# Patient Record
Sex: MALE | Race: WHITE | NOT HISPANIC OR LATINO | Employment: FULL TIME | URBAN - NONMETROPOLITAN AREA
[De-identification: names, ages, dates, MRNs, and addresses within clinical notes are randomized per-mention and may not be internally consistent; named-entity substitution may affect disease eponyms.]

---

## 2023-03-20 DIAGNOSIS — I10 PRIMARY HYPERTENSION: Primary | ICD-10-CM

## 2023-03-20 RX ORDER — METOPROLOL TARTRATE 75 MG/1
75 TABLET, FILM COATED ORAL 2 TIMES DAILY
Qty: 60 TABLET | Refills: 2 | Status: SHIPPED | OUTPATIENT
Start: 2023-03-20 | End: 2023-06-12

## 2023-03-20 RX ORDER — METOPROLOL TARTRATE 75 MG/1
75 TABLET, FILM COATED ORAL 2 TIMES DAILY
COMMUNITY
End: 2023-03-20 | Stop reason: SDUPTHER

## 2023-05-23 PROBLEM — G89.29 CHRONIC RIGHT SHOULDER PAIN: Status: ACTIVE | Noted: 2023-05-23

## 2023-05-23 PROBLEM — G43.909 MIGRAINES: Status: ACTIVE | Noted: 2023-05-23

## 2023-05-23 PROBLEM — I10 ESSENTIAL HYPERTENSION: Status: ACTIVE | Noted: 2023-05-23

## 2023-05-23 PROBLEM — M25.511 CHRONIC RIGHT SHOULDER PAIN: Status: ACTIVE | Noted: 2023-05-23

## 2023-05-23 PROBLEM — J30.9 ALLERGIC RHINITIS DUE TO ALLERGEN: Status: ACTIVE | Noted: 2023-05-23

## 2023-05-23 NOTE — PROGRESS NOTES
Subjective   Patient ID:   Kavon Renee Jr. is a 47 y.o. male who presents for Headaches.  HPI  Right shoulder pain:  I gave Prednisone last visit.  X-ray was normal in Feb 2023.  I recommended PT if still bothersome.  Symptoms x months.  No acute injury.  Radiates down the whole arm.  Finger will go numb.  Still has good ROM.  Has tried and failed Ibuprofen 800 and Flexeril.    Migraines:  Taking Metoprolol.  Had been on Zofran, Imitrex, and Propranolol in the past which did not help.  Had a normal head CT in Nov 2017.  Has been having more headaches recently.  Home Bps have been high.    Sinus problems:  Taking Claritin.  Had been on allergy shots in the past.  States allergies have been very well under control.  Worse depending on the time of the season.    HTN:  Taking Metoprolol.  BP today is 150/94.  Checking BP at home and getting 160s.  Having increased headaches.    Health maintenance:  Smoking: Never a smoker  Labs: Oct 2022  Colonoscopy: Has fam hx of colon cancer. Never had a colonoscopy. DUE - I referred to general surgery in the past and he no showed     Review of Systems  12 point review of systems negative unless stated above in HPI    Vitals:    05/31/23 1045   BP: (!) 150/94   Pulse: 59   SpO2: 98%       Physical Exam  General: Alert and oriented, well nourished, no acute distress.  Lungs: Clear to auscultation, non-labored respiration.  Heart: Normal rate, regular rhythm, no murmur, gallop or edema.  Neurologic: Awake, alert, and oriented X3, CN II-XII intact.  Psychiatric: Cooperative, appropriate mood and affect.    Assessment/Plan   BP is elevated today.  Let's add in Lisinopril with the Metoprolol.  Please keep checking at home and call if getting over 140/90.  This should hopefully help the headaches too.  If symptoms persist or worsen despite current plan of care, please contact your healthcare provider for further evaluation.  Patient instructed to contact the office if there are any  questions regarding their care or treatment.   [facility] [phone].  [facility] [phone].    Fu 1-2 months  Diagnoses and all orders for this visit:  Essential hypertension  -     lisinopril 10 mg tablet; Take 1 tablet (10 mg) by mouth once daily.  Migraine without status migrainosus, not intractable, unspecified migraine type  Allergic rhinitis, unspecified seasonality, unspecified trigger  Chronic right shoulder pain

## 2023-05-31 ENCOUNTER — OFFICE VISIT (OUTPATIENT)
Dept: PRIMARY CARE | Facility: CLINIC | Age: 48
End: 2023-05-31
Payer: COMMERCIAL

## 2023-05-31 VITALS
HEIGHT: 71 IN | SYSTOLIC BLOOD PRESSURE: 150 MMHG | DIASTOLIC BLOOD PRESSURE: 94 MMHG | BODY MASS INDEX: 33.07 KG/M2 | WEIGHT: 236.2 LBS | OXYGEN SATURATION: 98 % | HEART RATE: 59 BPM

## 2023-05-31 DIAGNOSIS — M25.511 CHRONIC RIGHT SHOULDER PAIN: ICD-10-CM

## 2023-05-31 DIAGNOSIS — G43.909 MIGRAINE WITHOUT STATUS MIGRAINOSUS, NOT INTRACTABLE, UNSPECIFIED MIGRAINE TYPE: ICD-10-CM

## 2023-05-31 DIAGNOSIS — J30.9 ALLERGIC RHINITIS, UNSPECIFIED SEASONALITY, UNSPECIFIED TRIGGER: ICD-10-CM

## 2023-05-31 DIAGNOSIS — I10 ESSENTIAL HYPERTENSION: Primary | ICD-10-CM

## 2023-05-31 DIAGNOSIS — G89.29 CHRONIC RIGHT SHOULDER PAIN: ICD-10-CM

## 2023-05-31 PROCEDURE — 3080F DIAST BP >= 90 MM HG: CPT | Performed by: PHYSICIAN ASSISTANT

## 2023-05-31 PROCEDURE — 1036F TOBACCO NON-USER: CPT | Performed by: PHYSICIAN ASSISTANT

## 2023-05-31 PROCEDURE — 3077F SYST BP >= 140 MM HG: CPT | Performed by: PHYSICIAN ASSISTANT

## 2023-05-31 PROCEDURE — 99213 OFFICE O/P EST LOW 20 MIN: CPT | Performed by: PHYSICIAN ASSISTANT

## 2023-05-31 RX ORDER — LISINOPRIL 10 MG/1
10 TABLET ORAL DAILY
Qty: 30 TABLET | Refills: 2 | Status: SHIPPED | OUTPATIENT
Start: 2023-05-31 | End: 2023-06-26

## 2023-05-31 RX ORDER — LORATADINE 10 MG/1
1 TABLET ORAL DAILY
COMMUNITY
Start: 2020-02-26

## 2023-06-10 DIAGNOSIS — I10 PRIMARY HYPERTENSION: ICD-10-CM

## 2023-06-12 RX ORDER — METOPROLOL TARTRATE 75 MG/1
TABLET, FILM COATED ORAL
Qty: 180 TABLET | Refills: 0 | Status: SHIPPED | OUTPATIENT
Start: 2023-06-12 | End: 2023-09-18

## 2023-06-25 DIAGNOSIS — I10 ESSENTIAL HYPERTENSION: ICD-10-CM

## 2023-06-26 RX ORDER — LISINOPRIL 10 MG/1
TABLET ORAL
Qty: 30 TABLET | Refills: 2 | Status: SHIPPED | OUTPATIENT
Start: 2023-06-26 | End: 2023-09-22

## 2023-09-18 DIAGNOSIS — I10 PRIMARY HYPERTENSION: ICD-10-CM

## 2023-09-18 RX ORDER — METOPROLOL TARTRATE 75 MG/1
75 TABLET, FILM COATED ORAL 2 TIMES DAILY
Qty: 60 TABLET | Refills: 2 | Status: SHIPPED | OUTPATIENT
Start: 2023-09-18 | End: 2024-01-02 | Stop reason: SDUPTHER

## 2023-09-22 DIAGNOSIS — I10 ESSENTIAL HYPERTENSION: ICD-10-CM

## 2023-09-22 RX ORDER — LISINOPRIL 10 MG/1
TABLET ORAL
Qty: 30 TABLET | Refills: 2 | Status: SHIPPED | OUTPATIENT
Start: 2023-09-22 | End: 2023-12-18 | Stop reason: SDUPTHER

## 2023-11-28 ENCOUNTER — APPOINTMENT (OUTPATIENT)
Dept: PRIMARY CARE | Facility: CLINIC | Age: 48
End: 2023-11-28
Payer: COMMERCIAL

## 2023-12-11 PROBLEM — L81.4 OTHER MELANIN HYPERPIGMENTATION: Status: ACTIVE | Noted: 2021-11-22

## 2023-12-11 PROBLEM — R63.1 EXCESSIVE THIRST: Status: ACTIVE | Noted: 2021-11-22

## 2023-12-11 PROBLEM — M54.2 NECK PAIN: Status: RESOLVED | Noted: 2021-11-22 | Resolved: 2023-12-11

## 2023-12-11 PROBLEM — E66.9 OBESITY WITH BODY MASS INDEX 30 OR GREATER: Status: ACTIVE | Noted: 2021-11-22

## 2023-12-11 PROBLEM — L82.1 OTHER SEBORRHEIC KERATOSIS: Status: ACTIVE | Noted: 2021-11-22

## 2023-12-11 PROBLEM — R51.9 ACUTE NONINTRACTABLE HEADACHE: Status: RESOLVED | Noted: 2023-12-11 | Resolved: 2023-12-11

## 2023-12-11 PROBLEM — H57.89 CYST OF EYE: Status: RESOLVED | Noted: 2023-12-11 | Resolved: 2023-12-11

## 2023-12-11 PROBLEM — M54.2 NECK PAIN: Status: ACTIVE | Noted: 2023-12-11

## 2023-12-11 PROBLEM — D18.01 HEMANGIOMA OF SKIN AND SUBCUTANEOUS TISSUE: Status: ACTIVE | Noted: 2021-11-22

## 2023-12-11 PROBLEM — L91.8 OTHER HYPERTROPHIC DISORDERS OF THE SKIN: Status: ACTIVE | Noted: 2021-11-22

## 2023-12-11 PROBLEM — D48.5 NEOPLASM OF UNCERTAIN BEHAVIOR OF SKIN: Status: RESOLVED | Noted: 2021-11-22 | Resolved: 2023-12-11

## 2023-12-11 PROBLEM — R35.1 NOCTURIA: Status: ACTIVE | Noted: 2021-11-22

## 2023-12-11 PROBLEM — D22.4 MELANOCYTIC NEVI OF SCALP AND NECK: Status: RESOLVED | Noted: 2021-11-22 | Resolved: 2023-12-11

## 2023-12-11 PROBLEM — R63.1 EXCESSIVE THIRST: Status: RESOLVED | Noted: 2023-12-11 | Resolved: 2023-12-11

## 2023-12-11 PROBLEM — R73.9 HYPERGLYCEMIA: Status: ACTIVE | Noted: 2021-11-22

## 2023-12-11 PROBLEM — G62.9 NEUROPATHY, PERIPHERAL: Status: ACTIVE | Noted: 2023-12-11

## 2023-12-18 ENCOUNTER — OFFICE VISIT (OUTPATIENT)
Dept: PRIMARY CARE | Facility: CLINIC | Age: 48
End: 2023-12-18
Payer: COMMERCIAL

## 2023-12-18 VITALS
HEIGHT: 71 IN | DIASTOLIC BLOOD PRESSURE: 107 MMHG | HEART RATE: 53 BPM | WEIGHT: 244 LBS | SYSTOLIC BLOOD PRESSURE: 159 MMHG | BODY MASS INDEX: 34.16 KG/M2

## 2023-12-18 DIAGNOSIS — E66.9 OBESITY WITH BODY MASS INDEX 30 OR GREATER: ICD-10-CM

## 2023-12-18 DIAGNOSIS — I10 ESSENTIAL HYPERTENSION: Primary | ICD-10-CM

## 2023-12-18 PROCEDURE — 3080F DIAST BP >= 90 MM HG: CPT | Performed by: REGISTERED NURSE

## 2023-12-18 PROCEDURE — 1036F TOBACCO NON-USER: CPT | Performed by: REGISTERED NURSE

## 2023-12-18 PROCEDURE — 3077F SYST BP >= 140 MM HG: CPT | Performed by: REGISTERED NURSE

## 2023-12-18 PROCEDURE — 99214 OFFICE O/P EST MOD 30 MIN: CPT | Performed by: REGISTERED NURSE

## 2023-12-18 RX ORDER — LISINOPRIL 20 MG/1
20 TABLET ORAL DAILY
Qty: 30 TABLET | Refills: 1 | Status: SHIPPED | OUTPATIENT
Start: 2023-12-18 | End: 2024-01-10

## 2023-12-18 ASSESSMENT — ENCOUNTER SYMPTOMS
COUGH: 0
CONSTIPATION: 0
HEADACHES: 0
WEAKNESS: 0
CONFUSION: 0
VOMITING: 0
RHINORRHEA: 0
EYE DISCHARGE: 0
NERVOUS/ANXIOUS: 0
FEVER: 0
WOUND: 0
ABDOMINAL PAIN: 0
SHORTNESS OF BREATH: 0
DIARRHEA: 0
CHILLS: 0
WHEEZING: 0
DIFFICULTY URINATING: 0
NAUSEA: 0
EYE REDNESS: 0
FREQUENCY: 0
DIZZINESS: 0

## 2023-12-18 NOTE — PROGRESS NOTES
"Subjective   Patient ID: Kavon Renee Jr. is a 48 y.o. male who presents for Establish Care (Discuss wt loss options; use to follow popil; hx of HTN; declining flu vaccine;).    HPI     HTN: Taking lisinopril and metoprolol. BP is very elevated in office today. He has a bp cuff at home and can check. He has been having headaches with this. Sometimes severe enough for ER visits     BMI 34: Would like to discuss weight loss options. He works second shift.  Cut out pop for 3 months. Drinking about 40 ounces of water per day.  He eats some TV dinners. He does not like to add a lot of salt. Working on cutting this out.   24 hr recall:   Does not eat breakfast.   Lunch eats pb&j   His wife buys him a chocolate pie and tv dinner, mac n cheese and chips. For breaks at work.     All other systems reviewed and negative for complaint unless stated above.    Colonoscopy: never had       Review of Systems   Constitutional:  Negative for chills and fever.   HENT:  Negative for congestion, ear pain and rhinorrhea.    Eyes:  Negative for discharge and redness.   Respiratory:  Negative for cough, shortness of breath and wheezing.    Cardiovascular:  Negative for chest pain and leg swelling.   Gastrointestinal:  Negative for abdominal pain, constipation, diarrhea, nausea and vomiting.   Genitourinary:  Negative for difficulty urinating, frequency and urgency.   Musculoskeletal:  Negative for gait problem.   Skin:  Negative for rash and wound.   Neurological:  Negative for dizziness, weakness and headaches.   Psychiatric/Behavioral:  Negative for confusion. The patient is not nervous/anxious.        Objective   BP (!) 159/107 (BP Location: Right arm, Patient Position: Sitting, BP Cuff Size: Large adult)   Pulse 53   Ht 1.803 m (5' 11\")   Wt 111 kg (244 lb)   BMI 34.03 kg/m²     Physical Exam  Vitals reviewed.   Constitutional:       Appearance: Normal appearance.   HENT:      Head: Normocephalic.      Right Ear: Tympanic " membrane, ear canal and external ear normal.      Left Ear: Tympanic membrane, ear canal and external ear normal.      Nose: No rhinorrhea.      Mouth/Throat:      Mouth: Mucous membranes are moist.      Pharynx: Oropharynx is clear.   Eyes:      Pupils: Pupils are equal, round, and reactive to light.   Cardiovascular:      Rate and Rhythm: Normal rate and regular rhythm.      Pulses: Normal pulses.   Pulmonary:      Effort: Pulmonary effort is normal.      Breath sounds: Normal breath sounds.   Abdominal:      General: Abdomen is flat. Bowel sounds are normal.      Palpations: Abdomen is soft.   Musculoskeletal:         General: No tenderness. Normal range of motion.      Right lower leg: No edema.      Left lower leg: No edema.   Lymphadenopathy:      Cervical: No cervical adenopathy.   Skin:     General: Skin is warm and dry.      Findings: No rash.   Neurological:      Mental Status: He is alert and oriented to person, place, and time.   Psychiatric:         Mood and Affect: Mood normal.         Behavior: Behavior normal.       Assessment/Plan        #HTN  BP elevated   Continue metoprolol   Increasing lisinopril   Monitor BP at home and bring to follow up   Lisinopril causing some cough   May need to change to something else   Follow up bp check in 6-8 weeks     #  Body mass index is 34.03 kg/m².   encourage healthy diet and exercise   recommend 150 minutes of exercise per week  May be interested in medications   Will discuss at follow up

## 2024-01-02 DIAGNOSIS — I10 PRIMARY HYPERTENSION: ICD-10-CM

## 2024-01-02 RX ORDER — METOPROLOL TARTRATE 75 MG/1
75 TABLET, FILM COATED ORAL 2 TIMES DAILY
Qty: 60 TABLET | Refills: 2 | Status: SHIPPED | OUTPATIENT
Start: 2024-01-02 | End: 2024-04-02

## 2024-01-09 DIAGNOSIS — I10 ESSENTIAL HYPERTENSION: ICD-10-CM

## 2024-01-10 RX ORDER — LISINOPRIL 20 MG/1
20 TABLET ORAL DAILY
Qty: 90 TABLET | Refills: 1 | Status: SHIPPED | OUTPATIENT
Start: 2024-01-10 | End: 2024-02-12 | Stop reason: SDUPTHER

## 2024-02-09 NOTE — PROGRESS NOTES
Subjective   Patient ID: Kavon Renee Jr. is a 48 y.o. male who presents for Follow-up (6wks; lisinopril was increased, doing ok but getting headaches and feeling shaky as times; cut out pop completely; not losing wt; hasn't been checking BP daily; ).    HPI     HTN: Taking lisinopril and metoprolol. BP is very elevated in office today. He has a bp cuff at home and can check. He has been having headaches with this. Sometimes severe enough for ER visits   He has not been checking his BP at home. BP is still high in office today. He can feel when it is going to be high.      BMI 34: Would like to discuss weight loss options. He works second shift.  Cut out pop for 3 months. Drinking about 40 ounces of water per day.  He eats some TV dinners. He does not like to add a lot of salt. Working on cutting this out.   24 hr recall:   Does not eat breakfast.   Lunch eats pb&j   His wife buys him a chocolate pie and tv dinner, mac n cheese and chips. For breaks at work.     He cut out TV dinners, he knows he's eats carbs.      All other systems reviewed and negative for complaint unless stated above.     Colonoscopy: never had     Review of Systems   Constitutional:  Negative for chills and fever.   HENT:  Negative for congestion, ear pain and rhinorrhea.    Eyes:  Negative for discharge and redness.   Respiratory:  Negative for cough, shortness of breath and wheezing.    Cardiovascular:  Negative for chest pain and leg swelling.   Gastrointestinal:  Negative for abdominal pain, constipation, diarrhea, nausea and vomiting.   Genitourinary:  Negative for difficulty urinating, frequency and urgency.   Musculoskeletal:  Negative for gait problem.   Skin:  Negative for rash and wound.   Neurological:  Negative for dizziness, weakness and headaches.   Psychiatric/Behavioral:  Negative for confusion. The patient is not nervous/anxious.        Objective   BP (!) 152/110 (BP Location: Right arm, Patient Position: Sitting, BP Cuff  "Size: Large adult)   Pulse 60   Ht 1.803 m (5' 11\")   Wt 112 kg (246 lb)   BMI 34.31 kg/m²     Physical Exam  Constitutional:       Appearance: Normal appearance.   Cardiovascular:      Rate and Rhythm: Normal rate and regular rhythm.   Pulmonary:      Effort: Pulmonary effort is normal.      Breath sounds: Normal breath sounds.   Skin:     General: Skin is warm and dry.   Neurological:      Mental Status: He is alert and oriented to person, place, and time. Mental status is at baseline.   Psychiatric:         Mood and Affect: Mood normal.         Behavior: Behavior normal.       Assessment/Plan           #HTN  BP elevated   Continue metoprolol   Increasing lisinopril 40mg   Monitor BP at home and bring to follow up   Lisinopril causing some cough   May need to change to something else   Follow up bp check in 6-8 weeks      #  Body mass index is 34.03 kg/m².   encourage healthy diet and exercise   recommend 150 minutes of exercise per week  May be interested in medications   Will discuss at follow up   Declines injections  Discussed topamax, wellbutrin   Going to track diet until follow up   May trial medications at follow up   "

## 2024-02-12 ENCOUNTER — OFFICE VISIT (OUTPATIENT)
Dept: PRIMARY CARE | Facility: CLINIC | Age: 49
End: 2024-02-12
Payer: COMMERCIAL

## 2024-02-12 VITALS
HEART RATE: 60 BPM | SYSTOLIC BLOOD PRESSURE: 168 MMHG | BODY MASS INDEX: 34.44 KG/M2 | WEIGHT: 246 LBS | HEIGHT: 71 IN | DIASTOLIC BLOOD PRESSURE: 108 MMHG

## 2024-02-12 DIAGNOSIS — I10 ESSENTIAL HYPERTENSION: Primary | ICD-10-CM

## 2024-02-12 PROCEDURE — 3077F SYST BP >= 140 MM HG: CPT | Performed by: REGISTERED NURSE

## 2024-02-12 PROCEDURE — 3080F DIAST BP >= 90 MM HG: CPT | Performed by: REGISTERED NURSE

## 2024-02-12 PROCEDURE — 1036F TOBACCO NON-USER: CPT | Performed by: REGISTERED NURSE

## 2024-02-12 PROCEDURE — 99213 OFFICE O/P EST LOW 20 MIN: CPT | Performed by: REGISTERED NURSE

## 2024-02-12 RX ORDER — LISINOPRIL 40 MG/1
40 TABLET ORAL DAILY
Qty: 30 TABLET | Refills: 1 | Status: SHIPPED | OUTPATIENT
Start: 2024-02-12 | End: 2024-03-12

## 2024-02-12 ASSESSMENT — ENCOUNTER SYMPTOMS
CONSTIPATION: 0
RHINORRHEA: 0
WEAKNESS: 0
VOMITING: 0
CONFUSION: 0
NAUSEA: 0
COUGH: 0
DIARRHEA: 0
DIFFICULTY URINATING: 0
NERVOUS/ANXIOUS: 0
ABDOMINAL PAIN: 0
FREQUENCY: 0
SHORTNESS OF BREATH: 0
WHEEZING: 0
EYE DISCHARGE: 0
FEVER: 0
WOUND: 0
CHILLS: 0
EYE REDNESS: 0
DIZZINESS: 0
HEADACHES: 0

## 2024-03-12 DIAGNOSIS — I10 ESSENTIAL HYPERTENSION: ICD-10-CM

## 2024-03-12 RX ORDER — LISINOPRIL 40 MG/1
40 TABLET ORAL DAILY
Qty: 90 TABLET | Refills: 1 | Status: SHIPPED | OUTPATIENT
Start: 2024-03-12 | End: 2024-05-20 | Stop reason: ALTCHOICE

## 2024-03-19 NOTE — PROGRESS NOTES
Subjective   Patient ID: Kavon Renee Jr. is a 48 y.o. male who presents for Follow-up (Lisinopril increased last visit, hasn't been monitoring BP; decreased; his coffee intake;).    HPI   HTN: Has not been having as much headaches since increase of lisinopril.   Taking lisinopril and metoprolol. BP has improved a lot! Also has lost some weight since his last visit!      BMI 34: He works second shift.  Cut out pop for 3 months. Drinking about 40 ounces of water per day.  He cut out processed foods, cut back on sugar and coffee. Eating more salads.      All other systems reviewed and negative for complaint unless stated above.     Colonoscopy: never had     Review of Systems   Constitutional:  Negative for chills and fever.   HENT:  Negative for congestion, ear pain and rhinorrhea.    Eyes:  Negative for discharge and redness.   Respiratory:  Negative for cough, shortness of breath and wheezing.    Cardiovascular:  Negative for chest pain and leg swelling.   Gastrointestinal:  Negative for abdominal pain, constipation, diarrhea, nausea and vomiting.   Genitourinary:  Negative for difficulty urinating, frequency and urgency.   Musculoskeletal:  Negative for gait problem.   Skin:  Negative for rash and wound.   Neurological:  Negative for dizziness, weakness and headaches.   Psychiatric/Behavioral:  Negative for confusion. The patient is not nervous/anxious.        Objective   /89 (BP Location: Right arm, Patient Position: Sitting, BP Cuff Size: Large adult)   Pulse 56   Wt 110 kg (242 lb 9.6 oz)   BMI 33.84 kg/m²     Physical Exam  Constitutional:       Appearance: Normal appearance.   Cardiovascular:      Rate and Rhythm: Normal rate and regular rhythm.   Pulmonary:      Effort: Pulmonary effort is normal.      Breath sounds: Normal breath sounds.   Skin:     General: Skin is warm and dry.   Neurological:      Mental Status: He is alert and oriented to person, place, and time. Mental status is at  baseline.   Psychiatric:         Mood and Affect: Mood normal.         Behavior: Behavior normal.       Assessment/Plan           #HTN  BP elevated   Continue metoprolol   Increasing lisinopril 40mg   BP better controlled!     #  Body mass index is 34.03 kg/m².   encourage healthy diet and exercise   recommend 150 minutes of exercise per week  May be interested in medications   Will discuss at follow up   Declines injections  Discussed topamax, wellbutrin   Doing well with diet changes, monitor weight loss       Follow up in 6 months or sooner if needed

## 2024-03-25 ENCOUNTER — OFFICE VISIT (OUTPATIENT)
Dept: PRIMARY CARE | Facility: CLINIC | Age: 49
End: 2024-03-25
Payer: COMMERCIAL

## 2024-03-25 VITALS
WEIGHT: 242.6 LBS | DIASTOLIC BLOOD PRESSURE: 89 MMHG | BODY MASS INDEX: 33.84 KG/M2 | HEART RATE: 56 BPM | SYSTOLIC BLOOD PRESSURE: 142 MMHG

## 2024-03-25 DIAGNOSIS — I10 ESSENTIAL HYPERTENSION: ICD-10-CM

## 2024-03-25 PROCEDURE — 1036F TOBACCO NON-USER: CPT | Performed by: REGISTERED NURSE

## 2024-03-25 PROCEDURE — 99213 OFFICE O/P EST LOW 20 MIN: CPT | Performed by: REGISTERED NURSE

## 2024-03-25 PROCEDURE — 3077F SYST BP >= 140 MM HG: CPT | Performed by: REGISTERED NURSE

## 2024-03-25 PROCEDURE — 3079F DIAST BP 80-89 MM HG: CPT | Performed by: REGISTERED NURSE

## 2024-03-25 ASSESSMENT — ENCOUNTER SYMPTOMS
RHINORRHEA: 0
DIARRHEA: 0
WOUND: 0
SHORTNESS OF BREATH: 0
DIZZINESS: 0
ABDOMINAL PAIN: 0
WEAKNESS: 0
COUGH: 0
CONSTIPATION: 0
NERVOUS/ANXIOUS: 0
FEVER: 0
DIFFICULTY URINATING: 0
CONFUSION: 0
EYE REDNESS: 0
VOMITING: 0
EYE DISCHARGE: 0
FREQUENCY: 0
NAUSEA: 0
CHILLS: 0
WHEEZING: 0
HEADACHES: 0

## 2024-04-02 DIAGNOSIS — I10 PRIMARY HYPERTENSION: ICD-10-CM

## 2024-04-02 RX ORDER — METOPROLOL TARTRATE 75 MG/1
75 TABLET, FILM COATED ORAL 2 TIMES DAILY
Qty: 60 TABLET | Refills: 2 | Status: SHIPPED | OUTPATIENT
Start: 2024-04-02 | End: 2024-05-17 | Stop reason: HOSPADM

## 2024-04-02 RX ORDER — SUMATRIPTAN SUCCINATE 25 MG/1
TABLET ORAL
COMMUNITY
Start: 2019-05-13 | End: 2024-05-29 | Stop reason: WASHOUT

## 2024-05-16 ENCOUNTER — APPOINTMENT (OUTPATIENT)
Dept: CARDIOLOGY | Facility: HOSPITAL | Age: 49
End: 2024-05-16
Payer: COMMERCIAL

## 2024-05-16 ENCOUNTER — HOSPITAL ENCOUNTER (OUTPATIENT)
Facility: HOSPITAL | Age: 49
Setting detail: OBSERVATION
Discharge: HOME | End: 2024-05-17
Attending: FAMILY MEDICINE | Admitting: INTERNAL MEDICINE
Payer: COMMERCIAL

## 2024-05-16 ENCOUNTER — APPOINTMENT (OUTPATIENT)
Dept: RADIOLOGY | Facility: HOSPITAL | Age: 49
End: 2024-05-16
Payer: COMMERCIAL

## 2024-05-16 DIAGNOSIS — R06.02 SHORTNESS OF BREATH: ICD-10-CM

## 2024-05-16 DIAGNOSIS — R53.1 WEAKNESS: ICD-10-CM

## 2024-05-16 DIAGNOSIS — I10 PRIMARY HYPERTENSION: ICD-10-CM

## 2024-05-16 DIAGNOSIS — R61 DIAPHORESIS: Primary | ICD-10-CM

## 2024-05-16 LAB
ALBUMIN SERPL BCP-MCNC: 4.5 G/DL (ref 3.4–5)
ALP SERPL-CCNC: 52 U/L (ref 33–120)
ALT SERPL W P-5'-P-CCNC: 16 U/L (ref 10–52)
ANION GAP SERPL CALC-SCNC: 13 MMOL/L (ref 10–20)
APPEARANCE UR: CLEAR
AST SERPL W P-5'-P-CCNC: 26 U/L (ref 9–39)
BASOPHILS # BLD AUTO: 0.06 X10*3/UL (ref 0–0.1)
BASOPHILS NFR BLD AUTO: 0.5 %
BILIRUB SERPL-MCNC: 0.6 MG/DL (ref 0–1.2)
BILIRUB UR STRIP.AUTO-MCNC: NEGATIVE MG/DL
BNP SERPL-MCNC: 50 PG/ML (ref 0–99)
BUN SERPL-MCNC: 14 MG/DL (ref 6–23)
CALCIUM SERPL-MCNC: 9.1 MG/DL (ref 8.6–10.3)
CARDIAC TROPONIN I PNL SERPL HS: 6 NG/L (ref 0–20)
CARDIAC TROPONIN I PNL SERPL HS: 7 NG/L (ref 0–20)
CHLORIDE SERPL-SCNC: 103 MMOL/L (ref 98–107)
CO2 SERPL-SCNC: 26 MMOL/L (ref 21–32)
COLOR UR: YELLOW
CREAT SERPL-MCNC: 1 MG/DL (ref 0.5–1.3)
D DIMER PPP FEU-MCNC: <215 NG/ML FEU
EGFRCR SERPLBLD CKD-EPI 2021: >90 ML/MIN/1.73M*2
EOSINOPHIL # BLD AUTO: 0.11 X10*3/UL (ref 0–0.7)
EOSINOPHIL NFR BLD AUTO: 0.8 %
ERYTHROCYTE [DISTWIDTH] IN BLOOD BY AUTOMATED COUNT: 13 % (ref 11.5–14.5)
FLUAV RNA RESP QL NAA+PROBE: NOT DETECTED
FLUBV RNA RESP QL NAA+PROBE: NOT DETECTED
GLUCOSE SERPL-MCNC: 99 MG/DL (ref 74–99)
GLUCOSE UR STRIP.AUTO-MCNC: NEGATIVE MG/DL
HCT VFR BLD AUTO: 47 % (ref 41–52)
HGB BLD-MCNC: 16.4 G/DL (ref 13.5–17.5)
HOLD SPECIMEN: NORMAL
IMM GRANULOCYTES # BLD AUTO: 0.04 X10*3/UL (ref 0–0.7)
IMM GRANULOCYTES NFR BLD AUTO: 0.3 % (ref 0–0.9)
KETONES UR STRIP.AUTO-MCNC: NEGATIVE MG/DL
LACTATE SERPL-SCNC: 1.4 MMOL/L (ref 0.4–2)
LEUKOCYTE ESTERASE UR QL STRIP.AUTO: NEGATIVE
LYMPHOCYTES # BLD AUTO: 1.6 X10*3/UL (ref 1.2–4.8)
LYMPHOCYTES NFR BLD AUTO: 12.2 %
MAGNESIUM SERPL-MCNC: 1.98 MG/DL (ref 1.6–2.4)
MCH RBC QN AUTO: 30.3 PG (ref 26–34)
MCHC RBC AUTO-ENTMCNC: 34.9 G/DL (ref 32–36)
MCV RBC AUTO: 87 FL (ref 80–100)
MONOCYTES # BLD AUTO: 0.64 X10*3/UL (ref 0.1–1)
MONOCYTES NFR BLD AUTO: 4.9 %
NEUTROPHILS # BLD AUTO: 10.65 X10*3/UL (ref 1.2–7.7)
NEUTROPHILS NFR BLD AUTO: 81.3 %
NITRITE UR QL STRIP.AUTO: NEGATIVE
NRBC BLD-RTO: 0 /100 WBCS (ref 0–0)
PH UR STRIP.AUTO: 6 [PH]
PLATELET # BLD AUTO: 297 X10*3/UL (ref 150–450)
POTASSIUM SERPL-SCNC: 3.9 MMOL/L (ref 3.5–5.3)
PROT SERPL-MCNC: 7.7 G/DL (ref 6.4–8.2)
PROT UR STRIP.AUTO-MCNC: NEGATIVE MG/DL
RBC # BLD AUTO: 5.42 X10*6/UL (ref 4.5–5.9)
RBC # UR STRIP.AUTO: NEGATIVE /UL
SARS-COV-2 RNA RESP QL NAA+PROBE: NOT DETECTED
SODIUM SERPL-SCNC: 138 MMOL/L (ref 136–145)
SP GR UR STRIP.AUTO: 1.02
UROBILINOGEN UR STRIP.AUTO-MCNC: <2 MG/DL
WBC # BLD AUTO: 13.1 X10*3/UL (ref 4.4–11.3)

## 2024-05-16 PROCEDURE — 83605 ASSAY OF LACTIC ACID: CPT | Performed by: FAMILY MEDICINE

## 2024-05-16 PROCEDURE — 80053 COMPREHEN METABOLIC PANEL: CPT | Performed by: FAMILY MEDICINE

## 2024-05-16 PROCEDURE — 85025 COMPLETE CBC W/AUTO DIFF WBC: CPT | Performed by: FAMILY MEDICINE

## 2024-05-16 PROCEDURE — 2500000004 HC RX 250 GENERAL PHARMACY W/ HCPCS (ALT 636 FOR OP/ED): Performed by: FAMILY MEDICINE

## 2024-05-16 PROCEDURE — 71045 X-RAY EXAM CHEST 1 VIEW: CPT | Performed by: RADIOLOGY

## 2024-05-16 PROCEDURE — 71045 X-RAY EXAM CHEST 1 VIEW: CPT

## 2024-05-16 PROCEDURE — 85379 FIBRIN DEGRADATION QUANT: CPT | Performed by: FAMILY MEDICINE

## 2024-05-16 PROCEDURE — G0378 HOSPITAL OBSERVATION PER HR: HCPCS

## 2024-05-16 PROCEDURE — 36415 COLL VENOUS BLD VENIPUNCTURE: CPT | Performed by: FAMILY MEDICINE

## 2024-05-16 PROCEDURE — 93005 ELECTROCARDIOGRAM TRACING: CPT

## 2024-05-16 PROCEDURE — 2500000001 HC RX 250 WO HCPCS SELF ADMINISTERED DRUGS (ALT 637 FOR MEDICARE OP): Performed by: NURSE PRACTITIONER

## 2024-05-16 PROCEDURE — 96372 THER/PROPH/DIAG INJ SC/IM: CPT | Performed by: NURSE PRACTITIONER

## 2024-05-16 PROCEDURE — 81003 URINALYSIS AUTO W/O SCOPE: CPT | Performed by: FAMILY MEDICINE

## 2024-05-16 PROCEDURE — 96361 HYDRATE IV INFUSION ADD-ON: CPT

## 2024-05-16 PROCEDURE — 84484 ASSAY OF TROPONIN QUANT: CPT | Performed by: FAMILY MEDICINE

## 2024-05-16 PROCEDURE — 96360 HYDRATION IV INFUSION INIT: CPT | Mod: 59

## 2024-05-16 PROCEDURE — 2500000004 HC RX 250 GENERAL PHARMACY W/ HCPCS (ALT 636 FOR OP/ED): Performed by: NURSE PRACTITIONER

## 2024-05-16 PROCEDURE — 83880 ASSAY OF NATRIURETIC PEPTIDE: CPT | Performed by: FAMILY MEDICINE

## 2024-05-16 PROCEDURE — 99285 EMERGENCY DEPT VISIT HI MDM: CPT | Mod: 25

## 2024-05-16 PROCEDURE — 87636 SARSCOV2 & INF A&B AMP PRB: CPT | Performed by: FAMILY MEDICINE

## 2024-05-16 PROCEDURE — 83735 ASSAY OF MAGNESIUM: CPT | Performed by: FAMILY MEDICINE

## 2024-05-16 RX ORDER — ONDANSETRON HYDROCHLORIDE 2 MG/ML
4 INJECTION, SOLUTION INTRAVENOUS EVERY 8 HOURS PRN
Status: DISCONTINUED | OUTPATIENT
Start: 2024-05-16 | End: 2024-05-17 | Stop reason: HOSPADM

## 2024-05-16 RX ORDER — SODIUM CHLORIDE 9 MG/ML
125 INJECTION, SOLUTION INTRAVENOUS CONTINUOUS
Status: DISCONTINUED | OUTPATIENT
Start: 2024-05-16 | End: 2024-05-17 | Stop reason: HOSPADM

## 2024-05-16 RX ORDER — LORATADINE 10 MG/1
10 TABLET ORAL DAILY
Status: DISCONTINUED | OUTPATIENT
Start: 2024-05-17 | End: 2024-05-17 | Stop reason: HOSPADM

## 2024-05-16 RX ORDER — ACETAMINOPHEN 160 MG/5ML
650 SOLUTION ORAL EVERY 4 HOURS PRN
Status: DISCONTINUED | OUTPATIENT
Start: 2024-05-16 | End: 2024-05-17 | Stop reason: HOSPADM

## 2024-05-16 RX ORDER — TALC
3 POWDER (GRAM) TOPICAL NIGHTLY PRN
Status: DISCONTINUED | OUTPATIENT
Start: 2024-05-16 | End: 2024-05-17 | Stop reason: HOSPADM

## 2024-05-16 RX ORDER — LISINOPRIL 40 MG/1
40 TABLET ORAL DAILY
Status: DISCONTINUED | OUTPATIENT
Start: 2024-05-17 | End: 2024-05-17 | Stop reason: HOSPADM

## 2024-05-16 RX ORDER — ENOXAPARIN SODIUM 100 MG/ML
40 INJECTION SUBCUTANEOUS EVERY 24 HOURS
Status: DISCONTINUED | OUTPATIENT
Start: 2024-05-16 | End: 2024-05-17 | Stop reason: HOSPADM

## 2024-05-16 RX ORDER — ACETAMINOPHEN 325 MG/1
650 TABLET ORAL EVERY 4 HOURS PRN
Status: DISCONTINUED | OUTPATIENT
Start: 2024-05-16 | End: 2024-05-17 | Stop reason: HOSPADM

## 2024-05-16 RX ORDER — FAMOTIDINE 10 MG/ML
20 INJECTION INTRAVENOUS 2 TIMES DAILY
Status: DISCONTINUED | OUTPATIENT
Start: 2024-05-16 | End: 2024-05-17 | Stop reason: HOSPADM

## 2024-05-16 RX ORDER — SUMATRIPTAN SUCCINATE 25 MG/1
25 TABLET ORAL EVERY 2 HOUR PRN
Status: DISCONTINUED | OUTPATIENT
Start: 2024-05-16 | End: 2024-05-17 | Stop reason: HOSPADM

## 2024-05-16 RX ORDER — ONDANSETRON 4 MG/1
4 TABLET, FILM COATED ORAL EVERY 8 HOURS PRN
Status: DISCONTINUED | OUTPATIENT
Start: 2024-05-16 | End: 2024-05-17 | Stop reason: HOSPADM

## 2024-05-16 RX ORDER — ACETAMINOPHEN 650 MG/1
650 SUPPOSITORY RECTAL EVERY 4 HOURS PRN
Status: DISCONTINUED | OUTPATIENT
Start: 2024-05-16 | End: 2024-05-17 | Stop reason: HOSPADM

## 2024-05-16 RX ORDER — FAMOTIDINE 20 MG/1
20 TABLET, FILM COATED ORAL 2 TIMES DAILY
Status: DISCONTINUED | OUTPATIENT
Start: 2024-05-16 | End: 2024-05-17 | Stop reason: HOSPADM

## 2024-05-16 RX ADMIN — ENOXAPARIN SODIUM 40 MG: 40 INJECTION SUBCUTANEOUS at 23:04

## 2024-05-16 RX ADMIN — FAMOTIDINE 20 MG: 20 TABLET, FILM COATED ORAL at 23:04

## 2024-05-16 RX ADMIN — SODIUM CHLORIDE 125 ML/HR: 9 INJECTION, SOLUTION INTRAVENOUS at 15:52

## 2024-05-16 SDOH — HEALTH STABILITY: MENTAL HEALTH: EXPERIENCED ANY OF THE FOLLOWING LIFE EVENTS: DEATH OF FAMILY/FRIEND

## 2024-05-16 SDOH — SOCIAL STABILITY: SOCIAL INSECURITY: WERE YOU ABLE TO COMPLETE ALL THE BEHAVIORAL HEALTH SCREENINGS?: YES

## 2024-05-16 SDOH — SOCIAL STABILITY: SOCIAL INSECURITY: HAS ANYONE EVER THREATENED TO HURT YOUR FAMILY OR YOUR PETS?: NO

## 2024-05-16 SDOH — SOCIAL STABILITY: SOCIAL INSECURITY: ABUSE: ADULT

## 2024-05-16 SDOH — SOCIAL STABILITY: SOCIAL INSECURITY: ARE THERE ANY APPARENT SIGNS OF INJURIES/BEHAVIORS THAT COULD BE RELATED TO ABUSE/NEGLECT?: NO

## 2024-05-16 SDOH — SOCIAL STABILITY: SOCIAL INSECURITY: HAVE YOU HAD THOUGHTS OF HARMING ANYONE ELSE?: NO

## 2024-05-16 SDOH — SOCIAL STABILITY: SOCIAL INSECURITY: DO YOU FEEL ANYONE HAS EXPLOITED OR TAKEN ADVANTAGE OF YOU FINANCIALLY OR OF YOUR PERSONAL PROPERTY?: NO

## 2024-05-16 SDOH — SOCIAL STABILITY: SOCIAL INSECURITY: DO YOU FEEL UNSAFE GOING BACK TO THE PLACE WHERE YOU ARE LIVING?: NO

## 2024-05-16 SDOH — SOCIAL STABILITY: SOCIAL INSECURITY: HAVE YOU HAD ANY THOUGHTS OF HARMING ANYONE ELSE?: NO

## 2024-05-16 SDOH — SOCIAL STABILITY: SOCIAL INSECURITY: DOES ANYONE TRY TO KEEP YOU FROM HAVING/CONTACTING OTHER FRIENDS OR DOING THINGS OUTSIDE YOUR HOME?: NO

## 2024-05-16 SDOH — SOCIAL STABILITY: SOCIAL INSECURITY: ARE YOU OR HAVE YOU BEEN THREATENED OR ABUSED PHYSICALLY, EMOTIONALLY, OR SEXUALLY BY ANYONE?: NO

## 2024-05-16 ASSESSMENT — COGNITIVE AND FUNCTIONAL STATUS - GENERAL
MOBILITY SCORE: 24
DAILY ACTIVITIY SCORE: 24

## 2024-05-16 ASSESSMENT — LIFESTYLE VARIABLES
PRESCIPTION_ABUSE_PAST_12_MONTHS: NO
HOW OFTEN DO YOU HAVE 6 OR MORE DRINKS ON ONE OCCASION: NEVER
HOW MANY STANDARD DRINKS CONTAINING ALCOHOL DO YOU HAVE ON A TYPICAL DAY: PATIENT DOES NOT DRINK
SUBSTANCE_ABUSE_PAST_12_MONTHS: NO
SKIP TO QUESTIONS 9-10: 1
AUDIT-C TOTAL SCORE: 0
AUDIT-C TOTAL SCORE: 0
HOW OFTEN DO YOU HAVE A DRINK CONTAINING ALCOHOL: NEVER

## 2024-05-16 ASSESSMENT — ACTIVITIES OF DAILY LIVING (ADL)
GROOMING: INDEPENDENT
BATHING: INDEPENDENT
JUDGMENT_ADEQUATE_SAFELY_COMPLETE_DAILY_ACTIVITIES: YES
HEARING - RIGHT EAR: FUNCTIONAL
HEARING - LEFT EAR: FUNCTIONAL
PATIENT'S MEMORY ADEQUATE TO SAFELY COMPLETE DAILY ACTIVITIES?: YES
DRESSING YOURSELF: INDEPENDENT
ASSISTIVE_DEVICE: EYEGLASSES
FEEDING YOURSELF: INDEPENDENT
WALKS IN HOME: INDEPENDENT
TOILETING: INDEPENDENT
ADEQUATE_TO_COMPLETE_ADL: YES
LACK_OF_TRANSPORTATION: NO

## 2024-05-16 ASSESSMENT — PATIENT HEALTH QUESTIONNAIRE - PHQ9
1. LITTLE INTEREST OR PLEASURE IN DOING THINGS: NOT AT ALL
2. FEELING DOWN, DEPRESSED OR HOPELESS: NOT AT ALL
SUM OF ALL RESPONSES TO PHQ9 QUESTIONS 1 & 2: 0

## 2024-05-16 ASSESSMENT — PAIN - FUNCTIONAL ASSESSMENT: PAIN_FUNCTIONAL_ASSESSMENT: 0-10

## 2024-05-16 ASSESSMENT — COLUMBIA-SUICIDE SEVERITY RATING SCALE - C-SSRS
2. HAVE YOU ACTUALLY HAD ANY THOUGHTS OF KILLING YOURSELF?: NO
6. HAVE YOU EVER DONE ANYTHING, STARTED TO DO ANYTHING, OR PREPARED TO DO ANYTHING TO END YOUR LIFE?: NO
1. IN THE PAST MONTH, HAVE YOU WISHED YOU WERE DEAD OR WISHED YOU COULD GO TO SLEEP AND NOT WAKE UP?: NO

## 2024-05-16 NOTE — ED PROVIDER NOTES
HPI   No chief complaint on file.      HPI  This 48-year-old male patient came to the emergency room with complaint of shortness of breath for last 4 days.  Patient states that today he feels rundown and lethargic and weak this morning and some nausea diaphoresis and vomited 1 time, felt rundown and has been having lack of energy.  Went to work when his boss called him to the office where he supposed to talk about it and we can work to when he became dizzy lightheaded and weak and started extremely short of breath as result he was brought to the ER for evaluation.  After arrival he has been more awake and alert.  Denies any difficulty moving arms or legs or any weakness arms or legs or trouble keeping balance any chest pain or chest tightness or pain or swelling legs or hemoptysis or recent travel surgery or hospitalization.  He admitted history of hypertension but denies history of diabetes prior history of coronary artery disease or history of MI he has similar episode once in the past years ago but does not know the cause of workup in the past.  Patient father has history of coronary artery stent he send uncle has coronary artery disease.  Patient denies any drug abuse he drinks alcohol very rarely.  Denies any substance abuse disorder.      Family history: Reviewed    Social history: Reviewed, denies substance abuse.  Review of system: 10 review of system as In HPI otherwise unremarkable.           No data recorded                   Patient History   Past Medical History:   Diagnosis Date    Acute nonintractable headache 12/11/2023    Cyst of eye 12/11/2023    Excessive thirst 12/11/2023    Melanocytic nevi of scalp and neck 11/22/2021    Neoplasm of uncertain behavior of skin 11/22/2021    Other conditions influencing health status 01/15/2018    Cyst     Past Surgical History:   Procedure Laterality Date    OTHER SURGICAL HISTORY  05/13/2019    Scalp lesion excision     No family history on file.  Social  History     Tobacco Use    Smoking status: Never     Passive exposure: Never    Smokeless tobacco: Never   Substance Use Topics    Alcohol use: Never    Drug use: Never   EKG done at 1543 hrs. showed normal sinus rhythm rate of 81.  Normal NV interval.  No ST stabilization.  Inferior infarct age undetermined.  No STEMI.  Abnormal EKG.  I read this EKG.    Repeat EKG done at 1704 hrs. about 1 hour and 15 minutes later showed sinus bradycardia rate of 55.  Indeterminate axis.  Anterior infarct age undetermined.  Artifact limiting interpretation.  No STEMI.  Abnormal EKG NV at this EKG.    Physical Exam   ED Triage Vitals   Temp Pulse Resp BP   -- -- -- --      SpO2 Temp src Heart Rate Source Patient Position   -- -- -- --      BP Location FiO2 (%)     -- --       Physical Exam    CONSTITUTIONAL: Moderately overweight male patient was awake alert did not look sick toxic distress talking breathing comfortably no drooling stridor noted.  No facial drooping or drooling.  Moving arms or legs no tachypnea hypoxemia respite status.  Alert oriented x 3.    HENMT: The airway was intact. There was no ear or nose discharge. No drooling or stridor. Neck was supple. Talking and breathing comfortably.      EYES: Clear bilaterally, pupils equal, round and reactive to light.     CARDIOVASCULAR: Regular rate and rhythm. No friction rub or murmur . No peripheral edema good peripheral pulses calf is nontender Homans' sign negative good skin perfusion cap refill less than 2 seconds.  RESPIRATORY: Patient was breathing comfortably. No tachypnea hypoxemia respite distress noted.  Talking breathing comfortably.    GASTROINTESTINAL: Abdomen soft positive bowel sounds abdomen soft positive bowel sound nontender no guarding rebound surgery mild obesity abdomen noted.      GENITOURINARY:  No costovertebral angle tenderness.     MUSCULOSKELETAL: Head was normocephalic atraumatic cervical thoracic lumbar spine nontender.  Chest was nontender   Both upper extremity good motion nontender intact distal pulse intact sensation.       NEUROLOGICAL: Awake alert pleasant and cooperative. No motor or sensory deficit no arms selective noted. Intact neurovascular function and motor function. No facial drooping or drooling. Talking and breathing comfortably.  Cranial nerves II to XII grossly intact. No arms selective noted. No nystagmus.      SKIN: Skin normal color for race, warm, dry and intact. No evidence of trauma or lesions. PSYCHIATRIC: Awake alert and without acute distress. No obvious depression, no suicidal thoughts or ideation. Appropriate mood. Talking and normal tone.     HEME/LYMPH: No adenopathy or splenomegaly.        CRITICAL CARE    VITAL SIGNS:                DISPOSITION      ED Course & MDM   Diagnoses as of 05/16/24 1821   Diaphoresis   Weakness   Shortness of breath   Primary hypertension       Medical Decision Making  This 48-year-old male patient came to the emergency room with complaint of shortness of breath for last 4 days.  Patient states that today he feels rundown and lethargic and weak this morning and some nausea diaphoresis and vomited 1 time, felt rundown and has been having lack of energy.  Went to work when his boss called him to the office where he supposed to talk about it and we can work to when he became dizzy lightheaded and weak and started extremely short of breath as result he was brought to the ER for evaluation.  After arrival he has been more awake and alert.  Denies any difficulty moving arms or legs or any weakness arms or legs or trouble keeping balance any chest pain or chest tightness or pain or swelling legs or hemoptysis or recent travel surgery or hospitalization.  He admitted history of hypertension but denies history of diabetes prior history of coronary artery disease or history of MI he has similar episode once in the past years ago but does not know the cause of workup in the past.  Patient father has  history of coronary artery stent he send uncle has coronary artery disease.  Patient denies any drug abuse he drinks alcohol very rarely.  Denies any substance abuse disorder.    Moderately overweight male patient was awake alert did not look sick toxic distress talking breathing comfortably no drooling stridor noted.  No facial drooping or drooling.  Moving arms or legs no tachypnea hypoxemia respite status.  Alert oriented x 3.      Cardiovascular examination grossly unremarkable heart regular rate rhythm lungs auscultated no friction rub no murmur no peripheral edema calf is nontender.  Abdomen soft positive bowel sounds.  Neuro examination grossly was normal patient breathing comfortably no tachypnea hypoxemia respite distress but had diaphoresis and generalized fatigue and weakness without any focal isolated motor or sensory deficit no nuchal rigidity.  EKG showed no ST-T evaluation no STEMI.  Troponin negative.  However he was noted a slightly elevated white count of 13,000 with no left shift H&H is 16 and 47 no anemia chemistry unremarkable D-dimer less than 215 normal magnesium 1.98 normal lactate 1.4 troponin negative.    Patient was recommended observation due to presenting symptom leukocytosis fatigue weakness diaphoresis risk factor for coronary artery disease prior and hypertension.      Procedure  Procedures     Hanna Chester MD  05/16/24 1820       Hanna Chester MD  05/16/24 1822       Hanna Chester MD  06/01/24 0259    06/01/24 0313       Hanna Chester MD  06/01/24 0415

## 2024-05-17 VITALS
RESPIRATION RATE: 18 BRPM | SYSTOLIC BLOOD PRESSURE: 160 MMHG | WEIGHT: 220.02 LBS | HEART RATE: 53 BPM | TEMPERATURE: 97.1 F | HEIGHT: 71 IN | OXYGEN SATURATION: 97 % | BODY MASS INDEX: 30.8 KG/M2 | DIASTOLIC BLOOD PRESSURE: 87 MMHG

## 2024-05-17 LAB
ANION GAP SERPL CALC-SCNC: 9 MMOL/L (ref 10–20)
BUN SERPL-MCNC: 13 MG/DL (ref 6–23)
CALCIUM SERPL-MCNC: 8.4 MG/DL (ref 8.6–10.3)
CHLORIDE SERPL-SCNC: 106 MMOL/L (ref 98–107)
CO2 SERPL-SCNC: 26 MMOL/L (ref 21–32)
CREAT SERPL-MCNC: 0.96 MG/DL (ref 0.5–1.3)
EGFRCR SERPLBLD CKD-EPI 2021: >90 ML/MIN/1.73M*2
ERYTHROCYTE [DISTWIDTH] IN BLOOD BY AUTOMATED COUNT: 12.9 % (ref 11.5–14.5)
EST. AVERAGE GLUCOSE BLD GHB EST-MCNC: 103 MG/DL
GLUCOSE SERPL-MCNC: 82 MG/DL (ref 74–99)
HBA1C MFR BLD: 5.2 %
HCT VFR BLD AUTO: 45.4 % (ref 41–52)
HGB BLD-MCNC: 15.6 G/DL (ref 13.5–17.5)
HOLD SPECIMEN: NORMAL
MCH RBC QN AUTO: 30 PG (ref 26–34)
MCHC RBC AUTO-ENTMCNC: 34.4 G/DL (ref 32–36)
MCV RBC AUTO: 87 FL (ref 80–100)
NRBC BLD-RTO: 0 /100 WBCS (ref 0–0)
PLATELET # BLD AUTO: 265 X10*3/UL (ref 150–450)
POTASSIUM SERPL-SCNC: 3.3 MMOL/L (ref 3.5–5.3)
RBC # BLD AUTO: 5.2 X10*6/UL (ref 4.5–5.9)
SODIUM SERPL-SCNC: 138 MMOL/L (ref 136–145)
WBC # BLD AUTO: 7.9 X10*3/UL (ref 4.4–11.3)

## 2024-05-17 PROCEDURE — G0378 HOSPITAL OBSERVATION PER HR: HCPCS

## 2024-05-17 PROCEDURE — 85027 COMPLETE CBC AUTOMATED: CPT | Performed by: NURSE PRACTITIONER

## 2024-05-17 PROCEDURE — 82374 ASSAY BLOOD CARBON DIOXIDE: CPT | Performed by: NURSE PRACTITIONER

## 2024-05-17 PROCEDURE — 83036 HEMOGLOBIN GLYCOSYLATED A1C: CPT | Mod: CONLAB | Performed by: NURSE PRACTITIONER

## 2024-05-17 PROCEDURE — 2500000001 HC RX 250 WO HCPCS SELF ADMINISTERED DRUGS (ALT 637 FOR MEDICARE OP): Performed by: NURSE PRACTITIONER

## 2024-05-17 PROCEDURE — 99235 HOSP IP/OBS SAME DATE MOD 70: CPT | Performed by: INTERNAL MEDICINE

## 2024-05-17 PROCEDURE — 94760 N-INVAS EAR/PLS OXIMETRY 1: CPT

## 2024-05-17 PROCEDURE — 36415 COLL VENOUS BLD VENIPUNCTURE: CPT | Performed by: NURSE PRACTITIONER

## 2024-05-17 RX ORDER — METOPROLOL TARTRATE 50 MG/1
50 TABLET ORAL 2 TIMES DAILY
Qty: 60 TABLET | Refills: 2 | Status: SHIPPED | OUTPATIENT
Start: 2024-05-17 | End: 2024-05-20 | Stop reason: ALTCHOICE

## 2024-05-17 RX ADMIN — FAMOTIDINE 20 MG: 20 TABLET, FILM COATED ORAL at 08:40

## 2024-05-17 RX ADMIN — ACETAMINOPHEN 650 MG: 325 TABLET ORAL at 08:40

## 2024-05-17 ASSESSMENT — COGNITIVE AND FUNCTIONAL STATUS - GENERAL
DAILY ACTIVITIY SCORE: 24
MOBILITY SCORE: 24

## 2024-05-17 ASSESSMENT — PAIN - FUNCTIONAL ASSESSMENT: PAIN_FUNCTIONAL_ASSESSMENT: 0-10

## 2024-05-17 ASSESSMENT — ENCOUNTER SYMPTOMS
GASTROINTESTINAL NEGATIVE: 1
SEIZURES: 0
FACIAL ASYMMETRY: 0
CARDIOVASCULAR NEGATIVE: 1
SINUS PRESSURE: 1
NUMBNESS: 0
LIGHT-HEADEDNESS: 0
CONSTITUTIONAL NEGATIVE: 1
ALLERGIC/IMMUNOLOGIC NEGATIVE: 1
MUSCULOSKELETAL NEGATIVE: 1
FEVER: 0
RESPIRATORY NEGATIVE: 1
HEMATOLOGIC/LYMPHATIC NEGATIVE: 1
ENDOCRINE NEGATIVE: 1
TREMORS: 0
DIZZINESS: 0
HEADACHES: 0
ACTIVITY CHANGE: 0
WEAKNESS: 0
PSYCHIATRIC NEGATIVE: 1
FATIGUE: 0
EYES NEGATIVE: 1
DIAPHORESIS: 0
SPEECH DIFFICULTY: 0
APPETITE CHANGE: 0
CHILLS: 0

## 2024-05-17 ASSESSMENT — PAIN SCALES - GENERAL
PAINLEVEL_OUTOF10: 0 - NO PAIN
PAINLEVEL_OUTOF10: 4
PAINLEVEL_OUTOF10: 0 - NO PAIN

## 2024-05-17 ASSESSMENT — PAIN DESCRIPTION - LOCATION: LOCATION: HEAD

## 2024-05-17 NOTE — CARE PLAN
The patient's goals for the shift include  discharge to home    The clinical goals for the shift include STable blood pressure    Over the shift, the patient did not make progress toward the following goals. Barriers to progression include na. Recommendations to address these barriers include follow plan of care.

## 2024-05-17 NOTE — NURSING NOTE
2200: Pt arrived to unit accompanied by Supervisor. Pt oriented to room/unit. Pt states he's feeling a little better with only a slight headache and refuses Tylenol at this time. Call light within reach. Will continue to monitor.     2230: Pt with a heart rate 57-61. Elia Cates NP notified and order to hold Lopressor 75 mg received. Will continue to monitor.

## 2024-05-17 NOTE — DISCHARGE SUMMARY
"Discharge Diagnosis  Diaphoresis    Issues Requiring Follow-Up  Optometry  PCP    Test Results Pending At Discharge  Pending Labs       Order Current Status    Hemoglobin A1C In process            Hospital Course   48 y.o. male presenting with complaint of shortness of breath for last 4 days.  Patient states that today he feels rundown and lethargic and weak this morning and some nausea, diaphoresis and vomited 1 time, felt rundown and has been having lack of energy.  Went to work and his boss called him to his office when he became dizzy, lightheaded, and weak and became short of breath.  He was brought to the ER for evaluation.  After arrival he has been more awake and alert.  No blurriness or double vision.  Mild headache like a hat that's too tight, particularly in the back.  No numbness or weakness.  No family history of stroke or aneurysm.  He does note straining his eyes for work and that his doctor recently increased his blood pressure meds from Metoprolol 50mg once daily to 75mg twice daily.     1) malaise and weakness- possibly from the increase in his bp meds, although he was quite high on arrival.  His blood pressure seems quite labile.  I would like to decrease him to 50mg po bid in addition to his Lisinopril.     2) Headache- nothing here to suggest aneurysm or other serious neurologic cause.  He does seem to need glasses and would probably benefit from a lower dose of his metoprolol.      3) Labile hypertension- may indeed be a component of so-called \"white coat\".  His resting HR was in the 50s, so I will decrease his Metoprolol to 50mg po bid.       Pertinent Physical Exam At Time of Discharge  Physical Exam  Constitutional:       General: He is not in acute distress.     Appearance: Normal appearance. He is obese. He is not ill-appearing, toxic-appearing or diaphoretic.   HENT:      Head: Normocephalic and atraumatic.      Right Ear: Tympanic membrane normal.      Left Ear: Tympanic membrane normal. "      Nose: Nose normal.      Mouth/Throat:      Mouth: Mucous membranes are moist.      Pharynx: Oropharynx is clear. No oropharyngeal exudate or posterior oropharyngeal erythema.   Eyes:      General:         Right eye: No discharge.         Left eye: No discharge.      Conjunctiva/sclera: Conjunctivae normal.      Pupils: Pupils are equal, round, and reactive to light.   Neck:      Vascular: No carotid bruit.   Cardiovascular:      Rate and Rhythm: Normal rate and regular rhythm.      Pulses: Normal pulses.      Heart sounds: Normal heart sounds. No murmur heard.     No friction rub. No gallop.   Pulmonary:      Effort: Pulmonary effort is normal. No respiratory distress.      Breath sounds: No stridor. No wheezing, rhonchi or rales.   Chest:      Chest wall: No tenderness.   Abdominal:      General: There is no distension.      Palpations: There is no mass.      Tenderness: There is no abdominal tenderness. There is no right CVA tenderness, left CVA tenderness, guarding or rebound.      Hernia: No hernia is present.   Musculoskeletal:         General: No swelling, tenderness, deformity or signs of injury. Normal range of motion.      Cervical back: Normal range of motion and neck supple. No rigidity or tenderness.      Right lower leg: No edema.      Left lower leg: No edema.   Lymphadenopathy:      Cervical: No cervical adenopathy.   Skin:     General: Skin is warm and dry.      Capillary Refill: Capillary refill takes less than 2 seconds.      Coloration: Skin is not jaundiced.   Neurological:      General: No focal deficit present.      Mental Status: He is alert and oriented to person, place, and time.      Cranial Nerves: No cranial nerve deficit.      Sensory: No sensory deficit.      Motor: No weakness.      Coordination: Coordination normal.      Gait: Gait normal.      Deep Tendon Reflexes: Reflexes normal.   Psychiatric:         Mood and Affect: Mood normal.         Behavior: Behavior normal.     Home  Medications     Medication List      CHANGE how you take these medications     metoprolol tartrate 50 mg tablet; Commonly known as: Lopressor; Take 1   tablet by mouth 2 times a day.; What changed: medication strength, how   much to take     CONTINUE taking these medications     lisinopril 40 mg tablet; TAKE 1 TABLET BY MOUTH EVERY DAY   loratadine 10 mg tablet; Commonly known as: Claritin   SUMAtriptan 25 mg tablet; Commonly known as: Imitrex       Outpatient Follow-Up  Future Appointments   Date Time Provider Department [place]   5/28/2024  9:30 AM Lisa Carcamo APRLEONIDES-CNP [facility]   9/23/2024  9:00 AM Lisa Carcamo APRN-CNP [facility]       Elia Bullock MD

## 2024-05-17 NOTE — CARE PLAN
The patient's goals for the shift include  No episodes of dizziness, nausea.    The clinical goals for the shift include stable blood pressure    Over the shift, the patient did make progress toward the following goals. Pt denied any dizziness/nausea and blood pressure remained stable over night.

## 2024-05-17 NOTE — H&P
History Of Present Illness  48 y.o. male presenting with complaint of shortness of breath for last 4 days.  Patient states that today he feels rundown and lethargic and weak this morning and some nausea, diaphoresis and vomited 1 time, felt rundown and has been having lack of energy.  Went to work and his boss called him to his office when he became dizzy, lightheaded, and weak and became short of breath.  He was brought to the ER for evaluation.  After arrival he has been more awake and alert.  No blurriness or double vision.  Mild headache like a hat that's too tight, particularly in the back.  No numbness or weakness.  No family history of stroke or aneurysm.  He does note straining his eyes for work and that his doctor recently increased his blood pressure meds from Metoprolol 50mg once daily to 75mg twice daily.    Past Medical History  Past Medical History:   Diagnosis Date    Acute nonintractable headache 12/11/2023    Cyst of eye 12/11/2023    Excessive thirst 12/11/2023    Melanocytic nevi of scalp and neck 11/22/2021    Neoplasm of uncertain behavior of skin 11/22/2021    Other conditions influencing health status 01/15/2018    Cyst       Surgical History  Past Surgical History:   Procedure Laterality Date    OTHER SURGICAL HISTORY  05/13/2019    Scalp lesion excision        Social History  He reports that he has never smoked. He has never been exposed to tobacco smoke. He has never used smokeless tobacco. He reports that he does not drink alcohol and does not use drugs.    Family History  No family history on file.     Allergies  Iodinated contrast media    Review of Systems   Constitutional: Negative.  Negative for activity change, appetite change, chills, diaphoresis, fatigue and fever.   HENT:  Positive for sinus pressure. Negative for congestion, dental problem, ear discharge, ear pain and hearing loss.    Eyes: Negative.    Respiratory: Negative.     Cardiovascular: Negative.    Gastrointestinal:  Negative.    Endocrine: Negative.    Genitourinary: Negative.    Musculoskeletal: Negative.    Allergic/Immunologic: Negative.    Neurological:  Negative for dizziness, tremors, seizures, syncope, facial asymmetry, speech difficulty, weakness, light-headedness, numbness and headaches.   Hematological: Negative.    Psychiatric/Behavioral: Negative.          Physical Exam  Constitutional:       General: He is not in acute distress.     Appearance: Normal appearance. He is obese. He is not ill-appearing, toxic-appearing or diaphoretic.   HENT:      Head: Normocephalic and atraumatic.      Right Ear: Tympanic membrane normal.      Left Ear: Tympanic membrane normal.      Nose: Nose normal.      Mouth/Throat:      Mouth: Mucous membranes are moist.      Pharynx: Oropharynx is clear. No oropharyngeal exudate or posterior oropharyngeal erythema.   Eyes:      General:         Right eye: No discharge.         Left eye: No discharge.      Conjunctiva/sclera: Conjunctivae normal.      Pupils: Pupils are equal, round, and reactive to light.   Neck:      Vascular: No carotid bruit.   Cardiovascular:      Rate and Rhythm: Normal rate and regular rhythm.      Pulses: Normal pulses.      Heart sounds: Normal heart sounds. No murmur heard.     No friction rub. No gallop.   Pulmonary:      Effort: Pulmonary effort is normal. No respiratory distress.      Breath sounds: No stridor. No wheezing, rhonchi or rales.   Chest:      Chest wall: No tenderness.   Abdominal:      General: There is no distension.      Palpations: There is no mass.      Tenderness: There is no abdominal tenderness. There is no right CVA tenderness, left CVA tenderness, guarding or rebound.      Hernia: No hernia is present.   Musculoskeletal:         General: No swelling, tenderness, deformity or signs of injury. Normal range of motion.      Cervical back: Normal range of motion and neck supple. No rigidity or tenderness.      Right lower leg: No edema.      Left  "lower leg: No edema.   Lymphadenopathy:      Cervical: No cervical adenopathy.   Skin:     General: Skin is warm and dry.      Capillary Refill: Capillary refill takes less than 2 seconds.      Coloration: Skin is not jaundiced.   Neurological:      General: No focal deficit present.      Mental Status: He is alert and oriented to person, place, and time.      Cranial Nerves: No cranial nerve deficit.      Sensory: No sensory deficit.      Motor: No weakness.      Coordination: Coordination normal.      Gait: Gait normal.      Deep Tendon Reflexes: Reflexes normal.   Psychiatric:         Mood and Affect: Mood normal.         Behavior: Behavior normal.          Last Recorded Vitals  Blood pressure (!) 172/97, pulse 61, temperature 36.4 °C (97.5 °F), temperature source Temporal, resp. rate 18, height 1.803 m (5' 11\"), weight 99.8 kg (220 lb 0.3 oz), SpO2 96%.    Relevant Results  Scheduled medications  enoxaparin, 40 mg, subcutaneous, q24h  famotidine, 20 mg, oral, BID   Or  famotidine, 20 mg, intravenous, BID  lisinopril, 40 mg, oral, Daily  loratadine, 10 mg, oral, Daily  metoprolol tartrate, 75 mg, oral, BID      Continuous medications  sodium chloride 0.9%, 125 mL/hr, Last Rate: Stopped (05/16/24 1850)      PRN medications  PRN medications: acetaminophen **OR** acetaminophen **OR** acetaminophen, melatonin, ondansetron **OR** ondansetron, SUMAtriptan    Results for orders placed or performed during the hospital encounter of 05/16/24 (from the past 24 hour(s))   CBC and Auto Differential   Result Value Ref Range    WBC 13.1 (H) 4.4 - 11.3 x10*3/uL    nRBC 0.0 0.0 - 0.0 /100 WBCs    RBC 5.42 4.50 - 5.90 x10*6/uL    Hemoglobin 16.4 13.5 - 17.5 g/dL    Hematocrit 47.0 41.0 - 52.0 %    MCV 87 80 - 100 fL    MCH 30.3 26.0 - 34.0 pg    MCHC 34.9 32.0 - 36.0 g/dL    RDW 13.0 11.5 - 14.5 %    Platelets 297 150 - 450 x10*3/uL    Neutrophils % 81.3 40.0 - 80.0 %    Immature Granulocytes %, Automated 0.3 0.0 - 0.9 %    " Lymphocytes % 12.2 13.0 - 44.0 %    Monocytes % 4.9 2.0 - 10.0 %    Eosinophils % 0.8 0.0 - 6.0 %    Basophils % 0.5 0.0 - 2.0 %    Neutrophils Absolute 10.65 (H) 1.20 - 7.70 x10*3/uL    Immature Granulocytes Absolute, Automated 0.04 0.00 - 0.70 x10*3/uL    Lymphocytes Absolute 1.60 1.20 - 4.80 x10*3/uL    Monocytes Absolute 0.64 0.10 - 1.00 x10*3/uL    Eosinophils Absolute 0.11 0.00 - 0.70 x10*3/uL    Basophils Absolute 0.06 0.00 - 0.10 x10*3/uL   Comprehensive Metabolic Panel   Result Value Ref Range    Glucose 99 74 - 99 mg/dL    Sodium 138 136 - 145 mmol/L    Potassium 3.9 3.5 - 5.3 mmol/L    Chloride 103 98 - 107 mmol/L    Bicarbonate 26 21 - 32 mmol/L    Anion Gap 13 10 - 20 mmol/L    Urea Nitrogen 14 6 - 23 mg/dL    Creatinine 1.00 0.50 - 1.30 mg/dL    eGFR >90 >60 mL/min/1.73m*2    Calcium 9.1 8.6 - 10.3 mg/dL    Albumin 4.5 3.4 - 5.0 g/dL    Alkaline Phosphatase 52 33 - 120 U/L    Total Protein 7.7 6.4 - 8.2 g/dL    AST 26 9 - 39 U/L    Bilirubin, Total 0.6 0.0 - 1.2 mg/dL    ALT 16 10 - 52 U/L   Magnesium   Result Value Ref Range    Magnesium 1.98 1.60 - 2.40 mg/dL   Troponin I, High Sensitivity, Initial   Result Value Ref Range    Troponin I, High Sensitivity 7 0 - 20 ng/L   Lactate   Result Value Ref Range    Lactate 1.4 0.4 - 2.0 mmol/L   D-Dimer, VTE Exclusion   Result Value Ref Range    D-Dimer, Quantitative VTE Exclusion <215 <=500 ng/mL FEU   B-Type Natriuretic Peptide   Result Value Ref Range    BNP 50 0 - 99 pg/mL   Influenza A, and B PCR   Result Value Ref Range    Flu A Result Not Detected Not Detected    Flu B Result Not Detected Not Detected   Sars-CoV-2 PCR   Result Value Ref Range    Coronavirus 2019, PCR Not Detected Not Detected   Red Top   Result Value Ref Range    Extra Tube Hold for add-ons.    Urinalysis with Reflex Culture and Microscopic   Result Value Ref Range    Color, Urine Yellow Straw, Yellow    Appearance, Urine Clear Clear    Specific Gravity, Urine 1.016 1.005 - 1.035    pH,  Urine 6.0 5.0, 5.5, 6.0, 6.5, 7.0, 7.5, 8.0    Protein, Urine NEGATIVE NEGATIVE mg/dL    Glucose, Urine NEGATIVE NEGATIVE mg/dL    Blood, Urine NEGATIVE NEGATIVE    Ketones, Urine NEGATIVE NEGATIVE mg/dL    Bilirubin, Urine NEGATIVE NEGATIVE    Urobilinogen, Urine <2.0 <2.0 mg/dL    Nitrite, Urine NEGATIVE NEGATIVE    Leukocyte Esterase, Urine NEGATIVE NEGATIVE   Extra Urine Gray Tube   Result Value Ref Range    Extra Tube Hold for add-ons.    Troponin, High Sensitivity, 1 Hour   Result Value Ref Range    Troponin I, High Sensitivity 6 0 - 20 ng/L   CBC   Result Value Ref Range    WBC 7.9 4.4 - 11.3 x10*3/uL    nRBC 0.0 0.0 - 0.0 /100 WBCs    RBC 5.20 4.50 - 5.90 x10*6/uL    Hemoglobin 15.6 13.5 - 17.5 g/dL    Hematocrit 45.4 41.0 - 52.0 %    MCV 87 80 - 100 fL    MCH 30.0 26.0 - 34.0 pg    MCHC 34.4 32.0 - 36.0 g/dL    RDW 12.9 11.5 - 14.5 %    Platelets 265 150 - 450 x10*3/uL   Basic metabolic panel   Result Value Ref Range    Glucose 82 74 - 99 mg/dL    Sodium 138 136 - 145 mmol/L    Potassium 3.3 (L) 3.5 - 5.3 mmol/L    Chloride 106 98 - 107 mmol/L    Bicarbonate 26 21 - 32 mmol/L    Anion Gap 9 (L) 10 - 20 mmol/L    Urea Nitrogen 13 6 - 23 mg/dL    Creatinine 0.96 0.50 - 1.30 mg/dL    eGFR >90 >60 mL/min/1.73m*2    Calcium 8.4 (L) 8.6 - 10.3 mg/dL      XR chest 1 view    Result Date: 5/16/2024  Interpreted By:  Juan Pablo Waterman, STUDY: XR CHEST 1 VIEW;  5/16/2024 4:04 pm   INDICATION: Signs/Symptoms:Shortness of breath.   COMPARISON: None.   ACCESSION NUMBER(S): [id number]   ORDERING CLINICIAN: DAVID CONCEPCION   FINDINGS:   The cardiac silhouette is unremarkable. Costophrenic angles are sharp. Lungs are clear. The trachea is midline. There is no pneumothorax. No acute osseous abnormality is seen.       1.  No active cardiopulmonary process.     Signed by: Juan Pablo Waterman 5/16/2024 4:09 PM Dictation workstation:   [id number]             Assessment/Plan   Principal Problem:    Diaphoresis  Active Problems:    Primary  "hypertension    Shortness of breath    Weakness      1) malaise and weakness- possibly from the increase in his bp meds, although he was quite high on arrival.  His blood pressure seems quite labile.  I would like to decrease him to 50mg po bid in addition to his Lisinopril.    2) Headache- nothing here to suggest aneurysm or other serious neurologic cause.  He does seem to need glasses and would probably benefit from a lower dose of his metoprolol.     3) Labile hypertension- may indeed be a component of so-called \"white coat\".  His resting HR was in the 50s, so I will decrease his Metoprolol to 50mg po bid.                Elia Bullock MD    "

## 2024-05-20 ENCOUNTER — PATIENT OUTREACH (OUTPATIENT)
Dept: CARE COORDINATION | Facility: CLINIC | Age: 49
End: 2024-05-20

## 2024-05-20 ENCOUNTER — OFFICE VISIT (OUTPATIENT)
Dept: CARDIOLOGY | Facility: CLINIC | Age: 49
End: 2024-05-20
Payer: COMMERCIAL

## 2024-05-20 VITALS
BODY MASS INDEX: 33.36 KG/M2 | OXYGEN SATURATION: 98 % | WEIGHT: 239.2 LBS | HEART RATE: 54 BPM | DIASTOLIC BLOOD PRESSURE: 100 MMHG | SYSTOLIC BLOOD PRESSURE: 155 MMHG

## 2024-05-20 DIAGNOSIS — I10 PRIMARY HYPERTENSION: Primary | ICD-10-CM

## 2024-05-20 DIAGNOSIS — E66.9 OBESITY WITH BODY MASS INDEX 30 OR GREATER: ICD-10-CM

## 2024-05-20 DIAGNOSIS — R06.02 SHORTNESS OF BREATH: ICD-10-CM

## 2024-05-20 PROCEDURE — 3077F SYST BP >= 140 MM HG: CPT | Performed by: NURSE PRACTITIONER

## 2024-05-20 PROCEDURE — 99204 OFFICE O/P NEW MOD 45 MIN: CPT | Performed by: NURSE PRACTITIONER

## 2024-05-20 PROCEDURE — 3080F DIAST BP >= 90 MM HG: CPT | Performed by: NURSE PRACTITIONER

## 2024-05-20 RX ORDER — POTASSIUM CHLORIDE 750 MG/1
10 TABLET, FILM COATED, EXTENDED RELEASE ORAL DAILY
Qty: 30 TABLET | Refills: 11 | Status: SHIPPED | OUTPATIENT
Start: 2024-05-20 | End: 2025-05-20

## 2024-05-20 RX ORDER — LOSARTAN POTASSIUM 100 MG/1
100 TABLET ORAL DAILY
Qty: 90 TABLET | Refills: 3 | Status: SHIPPED | OUTPATIENT
Start: 2024-05-20 | End: 2025-05-20

## 2024-05-20 RX ORDER — HYDROCHLOROTHIAZIDE 25 MG/1
25 TABLET ORAL DAILY
Qty: 90 TABLET | Refills: 3 | Status: SHIPPED | OUTPATIENT
Start: 2024-05-20 | End: 2025-05-20

## 2024-05-20 NOTE — PROGRESS NOTES
Primary Care Physician: Lisa Carcamo, APRN-CNP  Primary Cardiologist:      Date of Visit: 05/20/2024 10:00 AM EDT  Location of visit:  [facility]   Type of Visit: New Patient        Chief Complaint   Patient presents with    Hospital Follow-up     Sob, blurry vision, diaphoresis, dizziness, no chest pain              HPI / Summary:   Kavon Renee Jr. is a 48 y.o. male who presents to establish cardiac care   He denies personal cardiac history or prior evaluation       Hospitalized with overnight observation with c/o dizziness and shortness of breath,  found to have uncontrolled, though labile HTN and sinus bradycardia. Initial symptoms associated with  Hyperventilating,  pre syncope . 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  No daily exercise         FH:   Dad -- PCI   M.gfa - PPM   P.uncle -- CABG     12 system review is negative except as noted above  Accompanied by his spouse Keila,  who is a  colleague and contributed to the history     Medical History:   Past Medical History:   Diagnosis Date    Acute nonintractable headache 12/11/2023    Cyst of eye 12/11/2023    Excessive thirst 12/11/2023    Melanocytic nevi of scalp and neck 11/22/2021    Neoplasm of uncertain behavior of skin 11/22/2021    Other conditions influencing health status 01/15/2018    Cyst       Surgical History:   Past Surgical History:   Procedure Laterality Date    OTHER SURGICAL HISTORY  05/13/2019    Scalp lesion excision       Family History:   No family history on file.    Social History:   Tobacco Use: Low Risk  (5/20/2024)    Patient History     Smoking Tobacco Use: Never     Smokeless Tobacco Use: Never     Passive Exposure: Never             MEDICATIONS:   Current Outpatient Medications   Medication Instructions    lisinopril 40 mg, oral, Daily    loratadine (Claritin) 10 mg tablet 1 tablet,  oral, Daily    metoprolol tartrate (LOPRESSOR) 50 mg, oral, 2 times daily    SUMAtriptan (Imitrex) 25 mg tablet oral         IMAGING REVIEWED:    NONE      LABS:  CBC:   Lab Results   Component Value Date    WBC 7.9 05/17/2024    RBC 5.20 05/17/2024    HGB 15.6 05/17/2024    HCT 45.4 05/17/2024    MCV 87 05/17/2024    MCH 30.0 05/17/2024    MCHC 34.4 05/17/2024    RDW 12.9 05/17/2024     05/17/2024     CBC with Differential:    Lab Results   Component Value Date    WBC 7.9 05/17/2024    RBC 5.20 05/17/2024    HGB 15.6 05/17/2024    HCT 45.4 05/17/2024     05/17/2024    MCV 87 05/17/2024    MCH 30.0 05/17/2024    MCHC 34.4 05/17/2024    RDW 12.9 05/17/2024    NRBC 0.0 05/17/2024    LYMPHOPCT 12.2 05/16/2024    MONOPCT 4.9 05/16/2024    EOSPCT 0.8 05/16/2024    BASOPCT 0.5 05/16/2024    MONOSABS 0.64 05/16/2024    LYMPHSABS 1.60 05/16/2024    EOSABS 0.11 05/16/2024    BASOSABS 0.06 05/16/2024     CMP:    Lab Results   Component Value Date     05/17/2024    K 3.3 (L) 05/17/2024     05/17/2024    CO2 26 05/17/2024    BUN 13 05/17/2024    CREATININE 0.96 05/17/2024    GLUCOSE 82 05/17/2024    PROT 7.7 05/16/2024    CALCIUM 8.4 (L) 05/17/2024    BILITOT 0.6 05/16/2024    ALKPHOS 52 05/16/2024    AST 26 05/16/2024    ALT 16 05/16/2024     BMP:    Lab Results   Component Value Date     05/17/2024    K 3.3 (L) 05/17/2024     05/17/2024    CO2 26 05/17/2024    BUN 13 05/17/2024    CREATININE 0.96 05/17/2024    CALCIUM 8.4 (L) 05/17/2024    GLUCOSE 82 05/17/2024     Magnesium:  Lab Results   Component Value Date    MG 1.98 05/16/2024     Troponin:    Lab Results   Component Value Date    TROPHS 6 05/16/2024    TROPHS 7 05/16/2024     BNP:   Lab Results   Component Value Date    BNP 50 05/16/2024       Lipid Panel:  Lab Results   Component Value Date    HDL 54.0 10/03/2022    CHHDL 3.9 10/03/2022    VLDL 14 10/03/2022    TRIG 71 10/03/2022        Lab work and imaging results independently  reviewed by me         Visit Vitals  BP (!) 155/100   Pulse 54   Wt 109 kg (239 lb 3.2 oz)   SpO2 98%   BMI 33.36 kg/m²   Smoking Status Never   BSA 2.34 m²          ECG dated 5/16/2024 independently reviewed   SB 55        Constitutional:       Appearance: Healthy appearance. Not in distress.   Eyes:      Conjunctiva/sclera: Conjunctivae normal.   Neck:      Vascular: JVD normal.   Pulmonary:      Effort: Pulmonary effort is normal.      Breath sounds: Normal breath sounds.   Cardiovascular:      PMI at left midclavicular line. Normal rate. Regular rhythm. Normal S1. Normal S2.       Murmurs: There is no murmur.      No rub.   Pulses:     Intact distal pulses.   Edema:     Peripheral edema absent.   Abdominal:      General: Bowel sounds are normal.   Musculoskeletal:      Cervical back: Neck supple. Skin:     General: Skin is warm and dry.   Neurological:      Mental Status: Alert and oriented to person, place and time.               Problem List Items Addressed This Visit             ICD-10-CM    Primary hypertension - Primary I10    Relevant Medications    potassium chloride CR (Klor-Con) 10 mEq ER tablet    hydroCHLOROthiazide (HYDRODiuril) 25 mg tablet    losartan (Cozaar) 100 mg tablet    Other Relevant Orders    Basic metabolic panel    CT cardiac scoring wo IV contrast    Obesity with body mass index 30 or greater E66.9    Relevant Orders    CT cardiac scoring wo IV contrast    Shortness of breath R06.02    Relevant Orders    Transthoracic echo (TTE) complete    CT cardiac scoring wo IV contrast       Lisinopril causing a cough   --Change to losartan   Add hydrochlorothiazide with KCL supplement   BMP in 2 weeks   CT cardiac scoring with strong family hx of ASCVD   --most recent  mg/dL         05/20/24 at 10:10 AM - EDGAR Quintanilla        Followup Appts:  Future Appointments   Date Time Provider Department Center   5/28/2024  9:30 AM EDGAR Riggins [facility]   9/23/2024  9:00  [facility]

## 2024-05-20 NOTE — LETTER
May 21, 2024     EDGAR Riggins  [address]   [place] [address]    Patient: Kavon Renee Jr.   YOB: 1975   Date of Visit: 5/20/2024       Dear EDGAR Casarez:    Thank you for referring Kavon Renee to me for evaluation. Below are my notes for this consultation.  If you have questions, please do not hesitate to call me. I look forward to following your patient along with you.       Sincerely,     EDGAR Quintanilla      CC: No Recipients  ______________________________________________________________________________________    Primary Care Physician: EDGAR Riggins  Primary Cardiologist:      Date of Visit: 05/20/2024 10:00 AM EDT  Location of visit:  [facility]   Type of Visit: New Patient        Chief Complaint   Patient presents with   • Hospital Follow-up     Sob, blurry vision, diaphoresis, dizziness, no chest pain              HPI / Summary:   Kavon Renee Jr. is a 48 y.o. male who presents to establish cardiac care   He denies personal cardiac history or prior evaluation       Hospitalized with overnight observation with c/o dizziness and shortness of breath,  found to have uncontrolled, though labile HTN and sinus bradycardia. Initial symptoms associated with  Hyperventilating,  pre syncope . 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    Social History:   Tobacco Use: Low Risk  (5/20/2024)    Patient History    • Smoking Tobacco Use: Never    • Smokeless Tobacco Use: Never    • Passive Exposure: Never             MEDICATIONS:   Current Outpatient Medications   Medication Instructions   • lisinopril 40 mg, oral, Daily   • loratadine (Claritin) 10 mg tablet 1 tablet, oral, Daily   • metoprolol tartrate (LOPRESSOR) 50 mg, oral, 2 times daily   • SUMAtriptan (Imitrex) 25 mg tablet oral         IMAGING REVIEWED:    NONE      LABS:  CBC:   Lab Results   Component Value Date    WBC 7.9 05/17/2024    RBC 5.20 05/17/2024    HGB 15.6 05/17/2024    HCT 45.4 05/17/2024    MCV 87 05/17/2024    MCH 30.0 05/17/2024    MCHC 34.4 05/17/2024    RDW 12.9 05/17/2024     05/17/2024     CBC with Differential:    Lab Results   Component Value Date    WBC 7.9 05/17/2024    RBC 5.20 05/17/2024    HGB 15.6 05/17/2024    HCT 45.4 05/17/2024     05/17/2024    MCV 87 05/17/2024    MCH 30.0 05/17/2024    MCHC 34.4 05/17/2024    RDW 12.9 05/17/2024    NRBC 0.0 05/17/2024    LYMPHOPCT 12.2 05/16/2024    MONOPCT 4.9 05/16/2024    EOSPCT 0.8 05/16/2024    BASOPCT 0.5 05/16/2024    MONOSABS 0.64 05/16/2024    LYMPHSABS 1.60 05/16/2024    EOSABS 0.11 05/16/2024    BASOSABS 0.06 05/16/2024     CMP:    Lab Results   Component Value Date     05/17/2024    K 3.3 (L) 05/17/2024     05/17/2024    CO2 26 05/17/2024    BUN 13 05/17/2024    CREATININE 0.96 05/17/2024    GLUCOSE 82 05/17/2024    PROT 7.7 05/16/2024    CALCIUM 8.4 (L) 05/17/2024    BILITOT 0.6 05/16/2024    ALKPHOS 52 05/16/2024    AST 26 05/16/2024    ALT 16 05/16/2024     BMP:    Lab Results   Component Value Date      05/17/2024    K 3.3 (L) 05/17/2024     05/17/2024    CO2 26 05/17/2024    BUN 13 05/17/2024    CREATININE 0.96 05/17/2024    CALCIUM 8.4 (L) 05/17/2024    GLUCOSE 82 05/17/2024     Magnesium:  Lab Results   Component Value Date    MG 1.98 05/16/2024     Troponin:    Lab Results   Component Value Date    TROPHS 6 05/16/2024    TROPHS 7 05/16/2024     BNP:   Lab Results   Component Value Date    BNP 50 05/16/2024       Lipid Panel:  Lab Results   Component Value Date    HDL 54.0 10/03/2022    CHHDL 3.9 10/03/2022    VLDL 14 10/03/2022    TRIG 71 10/03/2022        Lab work and imaging results independently reviewed by me         Visit Vitals  BP (!) 155/100   Pulse 54   Wt 109 kg (239 lb 3.2 oz)   SpO2 98%   BMI 33.36 kg/m²   Smoking Status Never   BSA 2.34 m²          ECG dated 5/16/2024 independently reviewed   SB 55        Constitutional:       Appearance: Healthy appearance. Not in distress.   Eyes:      Conjunctiva/sclera: Conjunctivae normal.   Neck:      Vascular: JVD normal.   Pulmonary:      Effort: Pulmonary effort is normal.      Breath sounds: Normal breath sounds.   Cardiovascular:      PMI at left midclavicular line. Normal rate. Regular rhythm. Normal S1. Normal S2.       Murmurs: There is no murmur.      No rub.   Pulses:     Intact distal pulses.   Edema:     Peripheral edema absent.   Abdominal:      General: Bowel sounds are normal.   Musculoskeletal:      Cervical back: Neck supple. Skin:     General: Skin is warm and dry.   Neurological:      Mental Status: Alert and oriented to person, place and time.               Problem List Items Addressed This Visit             ICD-10-CM    Primary hypertension - Primary I10    Relevant Medications    potassium chloride CR (Klor-Con) 10 mEq ER tablet    hydroCHLOROthiazide (HYDRODiuril) 25 mg tablet    losartan (Cozaar) 100 mg tablet    Other Relevant Orders    Basic metabolic panel    CT cardiac scoring wo IV contrast    Obesity with body mass index 30  or greater E66.9    Relevant Orders    CT cardiac scoring wo IV contrast    Shortness of breath R06.02    Relevant Orders    Transthoracic echo (TTE) complete    CT cardiac scoring wo IV contrast       Lisinopril causing a cough   --Change to losartan   Add hydrochlorothiazide with KCL supplement   BMP in 2 weeks   CT cardiac scoring with strong family hx of ASCVD   --most recent  mg/dL         05/20/24 at 10:10 AM - EDGAR Quintanilla        Followup Appts:  Future Appointments   Date Time Provider Department [place]   5/28/2024  9:30 AM EDGAR Riggins [id number]   9/23/2024  9:00 AM EDGAR Riggins [id number]

## 2024-05-20 NOTE — PATIENT INSTRUCTIONS
Take metoprolol 25 mg twice day x1 week,  then stop it     Stop lisinopril -- start losartan on Thursday   Call if you develop side effects with the hydrochlorothiazide   Potassium supplement   Lab in 2 weeks    Home BP monitoring instructions:::: Goal < 130 / 80   Remain still, Avoid smoking, caffeinated beverages, or exercise within 30 min before BP measurements.  Ensure 5 min of quiet rest before BP measurements.  Sit correctly with back straight and supported (on a straight-backed dining chair, for example, rather than a sofa).  Sit with feet flat on the floor and legs uncrossed.  Keep arm supported on a flat surface (such as a table), with the upper arm at heart level.  Bottom of the cuff should be placed directly above the bend of the elbow  Take a reading in the AM before breakfast 2-3 times / week   Record all readings accurately:  A written log should be brought to all appointments   Monitors with built-in memory should be brought to all clinic appointments and calibrated to our machines

## 2024-05-20 NOTE — PROGRESS NOTES
Discharge Facility: [facility]   Discharge Diagnosis: Diaphoresis   Admission Date: 5-16-24  Discharge Date: 5-17-24     PCP Appointment Date: 5-28-24  Specialist Appointment Date: 5-20-24 Cardiology (Completed)  Hospital Encounter and Summary: Linked   Hortencia Merlos LPN

## 2024-05-20 NOTE — LETTER
May 21, 2024     Patient: Kavon Renee Jr.   YOB: 1975   Date of Visit: 5/20/2024       To Whom It May Concern:    Kavon Renee was seen in my clinic on 5/20/2024 at 10:00 am. Please excuse Kavon for his absence from work on this day to make the appointment.    If you have any questions or concerns, please don't hesitate to call.         Sincerely,         Lian Guy, CHEIKH-CNP        CC: No Recipients

## 2024-05-22 LAB
ATRIAL RATE: 55 BPM
ATRIAL RATE: 61 BPM
P AXIS: 26 DEGREES
P AXIS: 47 DEGREES
P OFFSET: 171 MS
P OFFSET: 172 MS
P ONSET: 121 MS
P ONSET: 122 MS
PR INTERVAL: 180 MS
PR INTERVAL: 184 MS
Q ONSET: 212 MS
Q ONSET: 213 MS
QRS COUNT: 10 BEATS
QRS COUNT: 9 BEATS
QRS DURATION: 102 MS
QRS DURATION: 104 MS
QT INTERVAL: 416 MS
QT INTERVAL: 450 MS
QTC CALCULATION(BAZETT): 418 MS
QTC CALCULATION(BAZETT): 430 MS
QTC FREDERICIA: 418 MS
QTC FREDERICIA: 437 MS
R AXIS: -28 DEGREES
R AXIS: 185 DEGREES
T AXIS: -7 DEGREES
T AXIS: 18 DEGREES
T OFFSET: 420 MS
T OFFSET: 438 MS
VENTRICULAR RATE: 55 BPM
VENTRICULAR RATE: 61 BPM

## 2024-05-29 ENCOUNTER — OFFICE VISIT (OUTPATIENT)
Dept: PRIMARY CARE | Facility: CLINIC | Age: 49
End: 2024-05-29
Payer: COMMERCIAL

## 2024-05-29 VITALS
HEART RATE: 81 BPM | DIASTOLIC BLOOD PRESSURE: 84 MMHG | BODY MASS INDEX: 33.63 KG/M2 | SYSTOLIC BLOOD PRESSURE: 132 MMHG | WEIGHT: 240.2 LBS | HEIGHT: 71 IN

## 2024-05-29 DIAGNOSIS — I10 PRIMARY HYPERTENSION: ICD-10-CM

## 2024-05-29 DIAGNOSIS — Z09 HOSPITAL DISCHARGE FOLLOW-UP: Primary | ICD-10-CM

## 2024-05-29 PROCEDURE — 99495 TRANSJ CARE MGMT MOD F2F 14D: CPT | Performed by: REGISTERED NURSE

## 2024-05-29 PROCEDURE — 3075F SYST BP GE 130 - 139MM HG: CPT | Performed by: REGISTERED NURSE

## 2024-05-29 PROCEDURE — 1036F TOBACCO NON-USER: CPT | Performed by: REGISTERED NURSE

## 2024-05-29 PROCEDURE — 3078F DIAST BP <80 MM HG: CPT | Performed by: REGISTERED NURSE

## 2024-05-29 ASSESSMENT — ENCOUNTER SYMPTOMS
CONFUSION: 0
DIARRHEA: 0
HEADACHES: 0
DIFFICULTY URINATING: 0
COUGH: 0
NAUSEA: 0
CHILLS: 0
VOMITING: 0
WEAKNESS: 0
SHORTNESS OF BREATH: 0
EYE DISCHARGE: 0
CONSTIPATION: 0
ABDOMINAL PAIN: 0
NERVOUS/ANXIOUS: 0
EYE REDNESS: 0
WHEEZING: 0
FREQUENCY: 0
DIZZINESS: 0
WOUND: 0
FEVER: 0
RHINORRHEA: 0

## 2024-05-29 NOTE — PROGRESS NOTES
"Subjective   Patient ID: Kavon Renee Jr. is a 48 y.o. male who presents for Hospital Follow-up (Admitted for about 2 days, HTN and feeling unwell, 205/198 day of going to ER; readings have been between systolic 135/123 to 96/84 diastolic since discharge; ).    HPI     Kavon Renee Jr. is a 48 y.o. male presenting today for follow-up after being discharged from the hospital 12 days ago. The main problem requiring admission was SOB. The discharge summary and/or TransitionalCare Management documentation was reviewed. Medication reconciliation was performed as indicated via the \"Del as Reviewed\" timestamp.    Kavon Renee Jr. was contacted by Transitional Care Management services two days after his discharge. This encounter and supporting documentation was reviewed.     HFU: \"complaint of shortness of breath for last 4 days.  Patient states that today he feels rundown and lethargic and weak this morning and some nausea diaphoresis and vomited 1 time, felt rundown and has been having lack of energy.  Went to work when his boss called him to the office where he supposed to talk about it and we can work to when he became dizzy lightheaded and weak and started extremely short of breath as result he was brought to the ER for evaluation.\"    Changed his metoprolol to 50mg BID with his lisinopril. States he had a headache while there and recommended glasses. Believed that his BP was contributed to \"white coat\" Cut back his metoprolol from 75mg to 50mg, bp was labile in the hospital.   He saw KIM Guy and his lisinopril was changed to losartan with hydrochlorothiazide with K+ supplement. Checking BMP in 2 weeks.   Stopped his metoprolol        BMI 34: He works second shift.  Cut out pop for 3 months. Drinking about 40 ounces of water per day.  He cut out processed foods, cut back on sugar and coffee. Eating more salads.      All other systems reviewed and negative for complaint unless stated above.     Colonoscopy: " "never had       Review of Systems   Constitutional:  Negative for chills and fever.   HENT:  Negative for congestion, ear pain and rhinorrhea.    Eyes:  Negative for discharge and redness.   Respiratory:  Negative for cough, shortness of breath and wheezing.    Cardiovascular:  Negative for chest pain and leg swelling.   Gastrointestinal:  Negative for abdominal pain, constipation, diarrhea, nausea and vomiting.   Genitourinary:  Negative for difficulty urinating, frequency and urgency.   Musculoskeletal:  Negative for gait problem.   Skin:  Negative for rash and wound.   Neurological:  Negative for dizziness, weakness and headaches.   Psychiatric/Behavioral:  Negative for confusion. The patient is not nervous/anxious.        Objective   /84   Pulse 81   Ht 1.803 m (5' 11\")   Wt 109 kg (240 lb 3.2 oz)   BMI 33.50 kg/m²     Physical Exam  Constitutional:       Appearance: Normal appearance.   Cardiovascular:      Rate and Rhythm: Normal rate and regular rhythm.   Pulmonary:      Effort: Pulmonary effort is normal.      Breath sounds: Normal breath sounds.   Skin:     General: Skin is warm and dry.   Neurological:      Mental Status: He is alert and oriented to person, place, and time. Mental status is at baseline.   Psychiatric:         Mood and Affect: Mood normal.         Behavior: Behavior normal.       Assessment/Plan       #HFU  Overall doing much better   Arnel Guy   Meds adjusted   Overall doing much better     #HTN  Losartan 100mg daily   Hydrochlorothiazide 25mg   Potassium 10meq daily   Checking bmp      #Body mass index is 34.03 kg/m².   encourage healthy diet and exercise   recommend 150 minutes of exercise per week  May be interested in medications   Will discuss at follow up   Declines injections  Discussed topamax, wellbutrin   Doing well with diet changes, monitor weight loss     "

## 2024-05-30 ENCOUNTER — PATIENT OUTREACH (OUTPATIENT)
Dept: CARE COORDINATION | Facility: CLINIC | Age: 49
End: 2024-05-30
Payer: COMMERCIAL

## 2024-05-30 ENCOUNTER — TELEPHONE (OUTPATIENT)
Dept: CARDIOLOGY | Facility: CLINIC | Age: 49
End: 2024-05-30
Payer: COMMERCIAL

## 2024-05-30 NOTE — PROGRESS NOTES
Unable to reach patient for call back after patient's follow up appointment with PCP.   LVM with call back number for patient to call if needed   If no voicemail available call attempts x 2 were made to contact the patient to assist with any questions or concerns patient may have.    Hortencia Merlos LPN

## 2024-05-31 ENCOUNTER — HOSPITAL ENCOUNTER (OUTPATIENT)
Dept: RADIOLOGY | Facility: HOSPITAL | Age: 49
Discharge: HOME | End: 2024-05-31
Payer: COMMERCIAL

## 2024-05-31 ENCOUNTER — HOSPITAL ENCOUNTER (OUTPATIENT)
Dept: CARDIOLOGY | Facility: HOSPITAL | Age: 49
Discharge: HOME | End: 2024-05-31
Payer: COMMERCIAL

## 2024-05-31 VITALS — DIASTOLIC BLOOD PRESSURE: 74 MMHG | SYSTOLIC BLOOD PRESSURE: 114 MMHG

## 2024-05-31 DIAGNOSIS — R06.02 SHORTNESS OF BREATH: ICD-10-CM

## 2024-05-31 DIAGNOSIS — I10 PRIMARY HYPERTENSION: ICD-10-CM

## 2024-05-31 DIAGNOSIS — E66.9 OBESITY WITH BODY MASS INDEX 30 OR GREATER: ICD-10-CM

## 2024-05-31 LAB
AORTIC VALVE PEAK VELOCITY: 1.09 M/S
AV PEAK GRADIENT: 4.8 MMHG
AVA (PEAK VEL): 3.26 CM2
EJECTION FRACTION APICAL 4 CHAMBER: 61
LEFT ATRIUM VOLUME AREA LENGTH INDEX BSA: 13.4 ML/M2
LEFT VENTRICLE INTERNAL DIMENSION DIASTOLE: 4.13 CM (ref 3.5–6)
LEFT VENTRICULAR OUTFLOW TRACT DIAMETER: 2.14 CM
LV EJECTION FRACTION BIPLANE: 62 %
MITRAL VALVE E/A RATIO: 0.76
MITRAL VALVE E/E' RATIO: 6.4
RIGHT VENTRICLE FREE WALL PEAK S': 10 CM/S
TRICUSPID ANNULAR PLANE SYSTOLIC EXCURSION: 2.1 CM

## 2024-05-31 PROCEDURE — 93306 TTE W/DOPPLER COMPLETE: CPT

## 2024-05-31 PROCEDURE — 93306 TTE W/DOPPLER COMPLETE: CPT | Performed by: INTERNAL MEDICINE

## 2024-05-31 PROCEDURE — 75571 CT HRT W/O DYE W/CA TEST: CPT

## 2024-06-05 ENCOUNTER — LAB (OUTPATIENT)
Dept: LAB | Facility: LAB | Age: 49
End: 2024-06-05
Payer: COMMERCIAL

## 2024-06-05 DIAGNOSIS — N63.0 MASS OF BREAST, UNSPECIFIED LATERALITY: ICD-10-CM

## 2024-06-05 DIAGNOSIS — I10 PRIMARY HYPERTENSION: ICD-10-CM

## 2024-06-05 LAB
ANION GAP SERPL CALC-SCNC: 12 MMOL/L (ref 10–20)
BUN SERPL-MCNC: 22 MG/DL (ref 6–23)
CALCIUM SERPL-MCNC: 9.4 MG/DL (ref 8.6–10.3)
CHLORIDE SERPL-SCNC: 103 MMOL/L (ref 98–107)
CO2 SERPL-SCNC: 30 MMOL/L (ref 21–32)
CREAT SERPL-MCNC: 1.23 MG/DL (ref 0.5–1.3)
EGFRCR SERPLBLD CKD-EPI 2021: 72 ML/MIN/1.73M*2
GLUCOSE SERPL-MCNC: 96 MG/DL (ref 74–99)
POTASSIUM SERPL-SCNC: 4 MMOL/L (ref 3.5–5.3)
SODIUM SERPL-SCNC: 141 MMOL/L (ref 136–145)

## 2024-06-05 PROCEDURE — 80048 BASIC METABOLIC PNL TOTAL CA: CPT

## 2024-06-05 PROCEDURE — 84402 ASSAY OF FREE TESTOSTERONE: CPT

## 2024-06-05 PROCEDURE — 36415 COLL VENOUS BLD VENIPUNCTURE: CPT

## 2024-06-07 ENCOUNTER — APPOINTMENT (OUTPATIENT)
Dept: CARDIOLOGY | Facility: HOSPITAL | Age: 49
End: 2024-06-07
Payer: COMMERCIAL

## 2024-06-07 ENCOUNTER — APPOINTMENT (OUTPATIENT)
Dept: RADIOLOGY | Facility: HOSPITAL | Age: 49
End: 2024-06-07
Payer: COMMERCIAL

## 2024-06-09 LAB
TESTOSTERONE FREE (CHAN): 43 PG/ML (ref 35–155)
TESTOSTERONE,TOTAL,LC-MS/MS: 180 NG/DL (ref 250–1100)

## 2024-06-13 DIAGNOSIS — I10 PRIMARY HYPERTENSION: Primary | ICD-10-CM

## 2024-06-14 RX ORDER — POTASSIUM CHLORIDE 750 MG/1
10 TABLET, FILM COATED, EXTENDED RELEASE ORAL DAILY
Qty: 30 TABLET | Refills: 11 | Status: SHIPPED | OUTPATIENT
Start: 2024-06-14 | End: 2025-06-14

## 2024-06-14 RX ORDER — LORATADINE 10 MG/1
10 TABLET ORAL DAILY
Qty: 90 TABLET | Refills: 1 | Status: SHIPPED | OUTPATIENT
Start: 2024-06-14

## 2024-06-14 RX ORDER — HYDROCHLOROTHIAZIDE 25 MG/1
25 TABLET ORAL DAILY
Qty: 90 TABLET | Refills: 3 | Status: SHIPPED | OUTPATIENT
Start: 2024-06-14 | End: 2025-06-14

## 2024-06-14 RX ORDER — LOSARTAN POTASSIUM 100 MG/1
100 TABLET ORAL DAILY
Qty: 90 TABLET | Refills: 3 | Status: SHIPPED | OUTPATIENT
Start: 2024-06-14 | End: 2025-06-14

## 2024-06-20 ENCOUNTER — PATIENT OUTREACH (OUTPATIENT)
Dept: CARE COORDINATION | Facility: CLINIC | Age: 49
End: 2024-06-20
Payer: COMMERCIAL

## 2024-06-20 NOTE — PROGRESS NOTES
Unable to reach patient for discharge follow up call.   LVM with call back number for patient to call if needed   If no voicemail available call attempts x 2 were made to contact the patient to assist with any questions or concerns patient may have.  Hortencia Merlos LPN

## 2024-06-24 DIAGNOSIS — N63.20 MASS OF LEFT BREAST, UNSPECIFIED QUADRANT: ICD-10-CM

## 2024-06-26 ENCOUNTER — HOSPITAL ENCOUNTER (OUTPATIENT)
Dept: RADIOLOGY | Facility: HOSPITAL | Age: 49
Discharge: HOME | End: 2024-06-26
Payer: COMMERCIAL

## 2024-06-26 VITALS — HEIGHT: 71 IN | WEIGHT: 235 LBS | BODY MASS INDEX: 32.9 KG/M2

## 2024-06-26 DIAGNOSIS — N63.20 MASS OF LEFT BREAST, UNSPECIFIED QUADRANT: ICD-10-CM

## 2024-06-26 PROCEDURE — 77062 BREAST TOMOSYNTHESIS BI: CPT

## 2024-06-26 PROCEDURE — 77062 BREAST TOMOSYNTHESIS BI: CPT | Performed by: STUDENT IN AN ORGANIZED HEALTH CARE EDUCATION/TRAINING PROGRAM

## 2024-06-26 PROCEDURE — 77066 DX MAMMO INCL CAD BI: CPT | Performed by: STUDENT IN AN ORGANIZED HEALTH CARE EDUCATION/TRAINING PROGRAM

## 2024-07-15 DIAGNOSIS — I10 PRIMARY HYPERTENSION: ICD-10-CM

## 2024-07-15 RX ORDER — POTASSIUM CHLORIDE 750 MG/1
10 TABLET, FILM COATED, EXTENDED RELEASE ORAL DAILY
Qty: 30 TABLET | Refills: 11 | Status: SHIPPED | OUTPATIENT
Start: 2024-07-15 | End: 2025-07-15

## 2024-07-15 RX ORDER — LOSARTAN POTASSIUM 100 MG/1
100 TABLET ORAL DAILY
Qty: 90 TABLET | Refills: 3 | Status: SHIPPED | OUTPATIENT
Start: 2024-07-15 | End: 2025-07-15

## 2024-07-15 RX ORDER — HYDROCHLOROTHIAZIDE 25 MG/1
25 TABLET ORAL DAILY
Qty: 90 TABLET | Refills: 3 | Status: SHIPPED | OUTPATIENT
Start: 2024-07-15 | End: 2025-07-15

## 2024-09-16 ENCOUNTER — APPOINTMENT (OUTPATIENT)
Dept: CARDIOLOGY | Facility: CLINIC | Age: 49
End: 2024-09-16
Payer: COMMERCIAL

## 2024-09-23 ENCOUNTER — APPOINTMENT (OUTPATIENT)
Dept: PRIMARY CARE | Facility: CLINIC | Age: 49
End: 2024-09-23
Payer: COMMERCIAL

## 2024-09-24 ENCOUNTER — OFFICE VISIT (OUTPATIENT)
Dept: PRIMARY CARE | Facility: CLINIC | Age: 49
End: 2024-09-24
Payer: COMMERCIAL

## 2024-09-24 VITALS
WEIGHT: 236.6 LBS | DIASTOLIC BLOOD PRESSURE: 87 MMHG | SYSTOLIC BLOOD PRESSURE: 130 MMHG | BODY MASS INDEX: 33 KG/M2 | HEART RATE: 83 BPM

## 2024-09-24 DIAGNOSIS — R79.89 LOW TESTOSTERONE IN MALE: ICD-10-CM

## 2024-09-24 DIAGNOSIS — I10 PRIMARY HYPERTENSION: Primary | ICD-10-CM

## 2024-09-24 DIAGNOSIS — E78.2 MIXED HYPERLIPIDEMIA: ICD-10-CM

## 2024-09-24 PROCEDURE — 99214 OFFICE O/P EST MOD 30 MIN: CPT | Performed by: REGISTERED NURSE

## 2024-09-24 PROCEDURE — 3075F SYST BP GE 130 - 139MM HG: CPT | Performed by: REGISTERED NURSE

## 2024-09-24 PROCEDURE — 1036F TOBACCO NON-USER: CPT | Performed by: REGISTERED NURSE

## 2024-09-24 PROCEDURE — 3079F DIAST BP 80-89 MM HG: CPT | Performed by: REGISTERED NURSE

## 2024-09-24 NOTE — PROGRESS NOTES
"Subjective   Patient ID: Kavon Renee Jr. is a 48 y.o. male who presents for Follow-up (Discuss Testerone; ).    HPI     Sinuses: Started yesterday. Denies fever or chills. + sinus pressure. Had some pressure on Friday, getting better and worse. Headache. + Pressure behind the eyes. Drinking tea with honey.     Low Testosterone: Would like to talk about testosterone. Was checked in June. Total testosterone 180. He is feeling better since changing up his medication from the hospital. But he still is sluggish. He is open to discussing replacement.     HTN: Changed his metoprolol to 50mg BID with his lisinopril. States he had a headache while there and recommended glasses. Believed that his BP was contributed to \"white coat\" Cut back his metoprolol from 75mg to 50mg, bp was labile in the hospital.   He saw KIM Guy and his lisinopril was changed to losartan with hydrochlorothiazide with K+ supplement. Checking BMP in 2 weeks.   Stopped his metoprolol     Switched to first shift.      BMI 33: He works second shift.  Cut out pop for 3 months. Drinking about 40 ounces of water per day.  He cut out processed foods, cut back on sugar and coffee. Eating more salads. He was 242lbs in March 2024, he is now 236lbs!      All other systems reviewed and negative for complaint unless stated above.     Colonoscopy: never had     Review of Systems    Objective   /87 (BP Location: Right arm, Patient Position: Sitting, BP Cuff Size: Large adult)   Pulse 83   Wt 107 kg (236 lb 9.6 oz)   BMI 33.00 kg/m²     Physical Exam  Constitutional:       Appearance: Normal appearance.   Cardiovascular:      Rate and Rhythm: Normal rate and regular rhythm.   Pulmonary:      Effort: Pulmonary effort is normal.      Breath sounds: Normal breath sounds.   Skin:     General: Skin is warm and dry.   Neurological:      Mental Status: He is alert and oriented to person, place, and time. Mental status is at baseline.   Psychiatric:         Mood " and Affect: Mood normal.         Behavior: Behavior normal.         Assessment/Plan          #HTN  Losartan 100mg daily   Hydrochlorothiazide 25mg   Potassium 10meq daily       #Body mass index is 34.03 kg/m².   encourage healthy diet and exercise   recommend 150 minutes of exercise per week  May be interested in medications   Will discuss at follow up   Declines injections  Discussed topamax, wellbutrin   Doing well with diet changes, monitor weight loss                   #Sinus   Continue claritin   Trial mucinex   If no improvement call and will send atb     #Testosterone   Referral for endocrine

## 2024-09-24 NOTE — PATIENT INSTRUCTIONS
Endocrinology:  Colton Yrok () [phone]  [facility] [phone]  Gagandeep Aiken () [phone]  Ailyn Nazario ([facility]) [phone]

## 2024-11-11 ENCOUNTER — APPOINTMENT (OUTPATIENT)
Dept: CARDIOLOGY | Facility: CLINIC | Age: 49
End: 2024-11-11
Payer: COMMERCIAL

## 2024-11-11 DIAGNOSIS — E66.9 OBESITY WITH BODY MASS INDEX 30 OR GREATER: ICD-10-CM

## 2024-11-11 DIAGNOSIS — I10 PRIMARY HYPERTENSION: Primary | ICD-10-CM

## 2024-11-11 PROCEDURE — 99202 OFFICE O/P NEW SF 15 MIN: CPT | Performed by: NURSE PRACTITIONER

## 2024-11-11 NOTE — PROGRESS NOTES
Primary Care Physician: Lisa Carcamo, APRN-CNP  Primary Cardiologist:      Date of Visit: 11/11/2024  3:00 PM EST  Location of visit:  [facility]   Type of Visit: Follow up         Chief Complaint   Patient presents with    Follow-up     Antihypertensive management              HPI / Summary:   Kavon Renee Jr. is a 49 y.o. male with HTN       He feels well,  losing weight with diet changes.  Home BP readings are much improved.  He is staying active         12 system review is negative except as noted above    Medical History:   Past Medical History:   Diagnosis Date    Acute nonintractable headache 12/11/2023    Cyst of eye 12/11/2023    Excessive thirst 12/11/2023    Melanocytic nevi of scalp and neck 11/22/2021    Neoplasm of uncertain behavior of skin 11/22/2021    Other conditions influencing health status 01/15/2018    Cyst       Surgical History:   Past Surgical History:   Procedure Laterality Date    OTHER SURGICAL HISTORY  05/13/2019    Scalp lesion excision       Family History:   No family history on file.    Social History:   Tobacco Use: Low Risk  (11/11/2024)    Patient History     Smoking Tobacco Use: Never     Smokeless Tobacco Use: Never     Passive Exposure: Never             MEDICATIONS:   Current Outpatient Medications   Medication Instructions    hydroCHLOROthiazide (HYDRODIURIL) 25 mg, oral, Daily    loratadine (CLARITIN) 10 mg, oral, Daily    losartan (COZAAR) 100 mg, oral, Daily    potassium chloride CR (Klor-Con) 10 mEq ER tablet 10 mEq, oral, Daily, Do not crush, chew, or split.         IMAGING REVIEWED:    NONE      LABS:  CBC:   Lab Results   Component Value Date    WBC 7.9 05/17/2024    RBC 5.20 05/17/2024    HGB 15.6 05/17/2024    HCT 45.4 05/17/2024    MCV 87 05/17/2024    MCH 30.0 05/17/2024    MCHC 34.4 05/17/2024    RDW 12.9 05/17/2024     05/17/2024     CBC with Differential:    Lab Results   Component Value Date    WBC 7.9 05/17/2024    RBC 5.20 05/17/2024    HGB  15.6 05/17/2024    HCT 45.4 05/17/2024     05/17/2024    MCV 87 05/17/2024    MCH 30.0 05/17/2024    MCHC 34.4 05/17/2024    RDW 12.9 05/17/2024    NRBC 0.0 05/17/2024    LYMPHOPCT 12.2 05/16/2024    MONOPCT 4.9 05/16/2024    EOSPCT 0.8 05/16/2024    BASOPCT 0.5 05/16/2024    MONOSABS 0.64 05/16/2024    LYMPHSABS 1.60 05/16/2024    EOSABS 0.11 05/16/2024    BASOSABS 0.06 05/16/2024     CMP:    Lab Results   Component Value Date     06/05/2024    K 4.0 06/05/2024     06/05/2024    CO2 30 06/05/2024    BUN 22 06/05/2024    CREATININE 1.23 06/05/2024    GLUCOSE 96 06/05/2024    PROT 7.7 05/16/2024    CALCIUM 9.4 06/05/2024    BILITOT 0.6 05/16/2024    ALKPHOS 52 05/16/2024    AST 26 05/16/2024    ALT 16 05/16/2024     BMP:    Lab Results   Component Value Date     06/05/2024    K 4.0 06/05/2024     06/05/2024    CO2 30 06/05/2024    BUN 22 06/05/2024    CREATININE 1.23 06/05/2024    CALCIUM 9.4 06/05/2024    GLUCOSE 96 06/05/2024     Magnesium:  Lab Results   Component Value Date    MG 1.98 05/16/2024     Troponin:    Lab Results   Component Value Date    TROPHS 6 05/16/2024    TROPHS 7 05/16/2024     BNP:   Lab Results   Component Value Date    BNP 50 05/16/2024       Lipid Panel:  Lab Results   Component Value Date    HDL 54.0 10/03/2022    CHHDL 3.9 10/03/2022    VLDL 14 10/03/2022    TRIG 71 10/03/2022        Lab work and imaging results independently reviewed by me         Visit Vitals  Smoking Status Never          ECG dated 5/16/2024 independently reviewed   SB 55            Problem List Items Addressed This Visit             ICD-10-CM    Primary hypertension - Primary I10    Obesity with body mass index 30 or greater E66.9       BP is in acceptable range on current RX which He is tolerating well and agrees to continue as follow;    Hydrochlorothiazide 25 mg daily    Losartan 100 mg daily   Mediterranean diet   150 minutes exercise / week              11/13/24 at 3:32 PM Sean PINO  EDGAR Guy        Followup Appts:  Future Appointments   Date Time Provider Department Center   2/11/2025  1:40 PM Colton York MD [id number]   3/25/2025  4:30 PM EDGAR Riggins [id number]

## 2024-12-19 DIAGNOSIS — I10 PRIMARY HYPERTENSION: ICD-10-CM

## 2024-12-20 RX ORDER — LORATADINE 10 MG/1
10 TABLET ORAL DAILY
Qty: 90 TABLET | Refills: 1 | Status: SHIPPED | OUTPATIENT
Start: 2024-12-20

## 2025-01-02 ENCOUNTER — TELEPHONE (OUTPATIENT)
Dept: ENDOCRINOLOGY | Facility: CLINIC | Age: 50
End: 2025-01-02
Payer: COMMERCIAL

## 2025-01-02 NOTE — TELEPHONE ENCOUNTER
Appointment note updated     ----- Message from Colton York sent at 12/30/2024 10:09 PM EST -----  Regarding: RE: NPV- testosterone verification  OK  ----- Message -----  From: Valarie Diego MA  Sent: 12/30/2024   3:38 PM EST  To: Colton York MD  Subject: NPV- testosterone verification                   Verifying appropriateness of NPV for testosterone on 2/11/25     Thank you,   Valarie

## 2025-02-04 DIAGNOSIS — R79.89 LOW TESTOSTERONE IN MALE: ICD-10-CM

## 2025-02-04 DIAGNOSIS — Z12.11 COLON CANCER SCREENING: ICD-10-CM

## 2025-02-11 ENCOUNTER — APPOINTMENT (OUTPATIENT)
Dept: ENDOCRINOLOGY | Facility: CLINIC | Age: 50
End: 2025-02-11
Payer: COMMERCIAL

## 2025-03-06 ENCOUNTER — PHARMACY VISIT (OUTPATIENT)
Dept: PHARMACY | Facility: CLINIC | Age: 50
End: 2025-03-06
Payer: COMMERCIAL

## 2025-03-06 DIAGNOSIS — Z12.11 SCREEN FOR COLON CANCER: ICD-10-CM

## 2025-03-06 PROCEDURE — RXMED WILLOW AMBULATORY MEDICATION CHARGE

## 2025-03-06 RX ORDER — SODIUM PICOSULFATE, MAGNESIUM OXIDE, AND ANHYDROUS CITRIC ACID 12; 3.5; 1 G/175ML; G/175ML; MG/175ML
2 LIQUID ORAL AS NEEDED
Qty: 350 ML | Refills: 0 | Status: SHIPPED | OUTPATIENT
Start: 2025-03-06

## 2025-03-25 ENCOUNTER — APPOINTMENT (OUTPATIENT)
Dept: PRIMARY CARE | Facility: CLINIC | Age: 50
End: 2025-03-25
Payer: COMMERCIAL

## 2025-05-06 ENCOUNTER — APPOINTMENT (OUTPATIENT)
Dept: PRIMARY CARE | Facility: CLINIC | Age: 50
End: 2025-05-06
Payer: COMMERCIAL

## 2025-05-29 ENCOUNTER — APPOINTMENT (OUTPATIENT)
Dept: PREADMISSION TESTING | Facility: HOSPITAL | Age: 50
End: 2025-05-29
Payer: COMMERCIAL

## 2025-06-11 NOTE — PROGRESS NOTES
"Subjective   Patient ID: Kavon Renee Jr. is a 49 y.o. male who presents for Audio Telehealth Appointment.    Low Testosterone: Would like to talk about testosterone. Was checked in June. Total testosterone 180. He is feeling better since changing up his medication from the hospital. But he still is sluggish. He is open to discussing replacement.      HTN: Changed his metoprolol to 50mg BID with his lisinopril. States he had a headache while there and recommended glasses. Believed that his BP was contributed to \"white coat\" Cut back his metoprolol from 75mg to 50mg, bp was labile in the hospital.   He saw KIM Guy and his lisinopril was changed to losartan with hydrochlorothiazide with K+ supplement. Checking BMP in 2 weeks.   Stopped his metoprolol   He is checking with his bp on his apple watch.     BMI 33: He works second shift.  Cut out pop for 3 months. Drinking about 40 ounces of water per day.  He cut out processed foods, cut back on sugar and coffee. Eating more salads. He was 242lbs in March 2024, he is now 236lbs! States he plateau. Has not been that great, not eating processed foods, eating out at times. \"More than what I should\"   He cut pop out, drinking black coffee. Feeling bloated.   His wife is on ozempic and was interested in this.      All other systems reviewed and negative for complaint unless stated above.     Colonoscopy: never had     An audio telecommunication system, which permits real time communications between the patient and provider, was utilized to provide this telehealth service.   Verbal consent was requested and obtained from Kvaon Renee Jr. on this date, 6/11/2025, for a telehealth visit and the patient's location was confirmed at the time of the visit.     While technically available, the patient was unable or unwilling to consent to connect via audio/video telehealth technology; therefore, I performed this visit using a real-time audio only connection "   _____________________________________________          Assessment/Plan     #HTN  Losartan 100mg daily   Hydrochlorothiazide 25mg   Potassium 10meq daily    States bp has been better but unable to give readings over the phone today      #Body mass index is 34.03 kg/m².   encourage healthy diet and exercise   recommend 150 minutes of exercise per week  May be interested in medications   Will discuss at follow up   Declines injections  Discussed topamax, wellbutrin   Would like to try topamax today    Follow up in 2 months to assess weight                     #Testosterone   Referral for endocrine     _____________________________________________  I spent 12 minutes in the professional and overall care of this patient.    I have communicated my name and active licensure. Telephone visit completed with realtime audio connection. Informed consent was obtained from the patient. Patient was made aware that my evaluation and diagnosis are limited due to the fact that we are not in the same room during the interview and that this is a virtual encounter that took place via telephone call. Patient verbalized understanding.

## 2025-06-17 ENCOUNTER — APPOINTMENT (OUTPATIENT)
Dept: PRIMARY CARE | Facility: CLINIC | Age: 50
End: 2025-06-17
Payer: COMMERCIAL

## 2025-06-17 DIAGNOSIS — E66.9 OBESITY WITH BODY MASS INDEX 30 OR GREATER: Primary | ICD-10-CM

## 2025-06-17 PROCEDURE — 98012 SYNCH AUDIO-ONLY EST SF 10: CPT | Performed by: REGISTERED NURSE

## 2025-06-17 RX ORDER — TOPIRAMATE 25 MG/1
25 TABLET, FILM COATED ORAL 2 TIMES DAILY
Qty: 60 TABLET | Refills: 5 | Status: SHIPPED | OUTPATIENT
Start: 2025-06-17 | End: 2025-12-14

## 2025-07-02 ENCOUNTER — APPOINTMENT (OUTPATIENT)
Dept: PREADMISSION TESTING | Facility: HOSPITAL | Age: 50
End: 2025-07-02
Payer: COMMERCIAL

## 2025-07-14 ENCOUNTER — APPOINTMENT (OUTPATIENT)
Dept: GASTROENTEROLOGY | Facility: HOSPITAL | Age: 50
End: 2025-07-14
Payer: COMMERCIAL

## 2025-08-11 DIAGNOSIS — I10 PRIMARY HYPERTENSION: ICD-10-CM

## 2025-08-11 RX ORDER — LOSARTAN POTASSIUM 100 MG/1
100 TABLET ORAL DAILY
Qty: 90 TABLET | Refills: 0 | Status: SHIPPED | OUTPATIENT
Start: 2025-08-11 | End: 2026-08-11

## 2025-08-11 RX ORDER — HYDROCHLOROTHIAZIDE 25 MG/1
25 TABLET ORAL DAILY
Qty: 90 TABLET | Refills: 11 | Status: SHIPPED | OUTPATIENT
Start: 2025-08-11 | End: 2026-08-11

## 2025-08-19 ENCOUNTER — APPOINTMENT (OUTPATIENT)
Dept: PRIMARY CARE | Facility: CLINIC | Age: 50
End: 2025-08-19
Payer: COMMERCIAL

## 2025-10-28 ENCOUNTER — APPOINTMENT (OUTPATIENT)
Dept: PRIMARY CARE | Facility: CLINIC | Age: 50
End: 2025-10-28
Payer: COMMERCIAL